# Patient Record
Sex: FEMALE | Race: BLACK OR AFRICAN AMERICAN | Employment: FULL TIME | URBAN - METROPOLITAN AREA
[De-identification: names, ages, dates, MRNs, and addresses within clinical notes are randomized per-mention and may not be internally consistent; named-entity substitution may affect disease eponyms.]

---

## 2016-08-04 LAB
ANTIBODY SCREEN, EXTERNAL: NORMAL
CHLAMYDIA, EXTERNAL: NORMAL
HBSAG, EXTERNAL: NORMAL
HIV, EXTERNAL: NORMAL
N. GONORRHEA, EXTERNAL: NORMAL
RPR, EXTERNAL: NORMAL
RUBELLA, EXTERNAL: NORMAL
TYPE, ABO & RH, EXTERNAL: NORMAL

## 2017-01-12 ENCOUNTER — APPOINTMENT (OUTPATIENT)
Dept: ULTRASOUND IMAGING | Age: 37
End: 2017-01-12
Attending: OBSTETRICS & GYNECOLOGY
Payer: COMMERCIAL

## 2017-01-12 ENCOUNTER — HOSPITAL ENCOUNTER (OUTPATIENT)
Age: 37
Setting detail: OBSERVATION
Discharge: HOME | End: 2017-01-13
Attending: OBSTETRICS & GYNECOLOGY | Admitting: OBSTETRICS & GYNECOLOGY
Payer: COMMERCIAL

## 2017-01-12 LAB
ABO + RH BLD: NORMAL
ALBUMIN SERPL BCP-MCNC: 2.7 G/DL (ref 3.4–5)
ALBUMIN/GLOB SERPL: 0.7 {RATIO} (ref 0.8–1.7)
ALP SERPL-CCNC: 137 U/L (ref 45–117)
ALT SERPL-CCNC: 19 U/L (ref 13–56)
AMYLASE SERPL-CCNC: 54 U/L (ref 25–115)
ANION GAP BLD CALC-SCNC: 12 MMOL/L (ref 3–18)
APPEARANCE UR: ABNORMAL
APPEARANCE UR: CLEAR
AST SERPL W P-5'-P-CCNC: 14 U/L (ref 15–37)
BASOPHILS # BLD AUTO: 0 K/UL (ref 0–0.06)
BASOPHILS # BLD: 0 % (ref 0–2)
BILIRUB SERPL-MCNC: 0.5 MG/DL (ref 0.2–1)
BILIRUB UR QL: NEGATIVE
BILIRUB UR QL: NEGATIVE
BLOOD GROUP ANTIBODIES SERPL: NORMAL
BUN SERPL-MCNC: 7 MG/DL (ref 7–18)
BUN/CREAT SERPL: 11 (ref 12–20)
CALCIUM SERPL-MCNC: 9 MG/DL (ref 8.5–10.1)
CHLORIDE SERPL-SCNC: 101 MMOL/L (ref 100–108)
CO2 SERPL-SCNC: 24 MMOL/L (ref 21–32)
COLOR UR: YELLOW
COLOR UR: YELLOW
CREAT SERPL-MCNC: 0.62 MG/DL (ref 0.6–1.3)
DIFFERENTIAL METHOD BLD: ABNORMAL
EOSINOPHIL # BLD: 0 K/UL (ref 0–0.4)
EOSINOPHIL NFR BLD: 0 % (ref 0–5)
ERYTHROCYTE [DISTWIDTH] IN BLOOD BY AUTOMATED COUNT: 14.2 % (ref 11.6–14.5)
FIBRONECTIN FETAL VAG QL: NEGATIVE
GLOBULIN SER CALC-MCNC: 4 G/DL (ref 2–4)
GLUCOSE SERPL-MCNC: 91 MG/DL (ref 74–99)
GLUCOSE UR QL STRIP.AUTO: NEGATIVE MG/DL
GLUCOSE UR QL STRIP.AUTO: NEGATIVE MG/DL
HCT VFR BLD AUTO: 32.2 % (ref 35–45)
HGB BLD-MCNC: 10.8 G/DL (ref 12–16)
KETONES UR-MCNC: ABNORMAL MG/DL
KETONES UR-MCNC: NEGATIVE MG/DL
LEUKOCYTE ESTERASE UR QL STRIP: NEGATIVE
LEUKOCYTE ESTERASE UR QL STRIP: NEGATIVE
LIPASE SERPL-CCNC: 115 U/L (ref 73–393)
LYMPHOCYTES # BLD AUTO: 10 % (ref 21–52)
LYMPHOCYTES # BLD: 1.2 K/UL (ref 0.9–3.6)
MCH RBC QN AUTO: 27.7 PG (ref 24–34)
MCHC RBC AUTO-ENTMCNC: 33.5 G/DL (ref 31–37)
MCV RBC AUTO: 82.6 FL (ref 74–97)
MONOCYTES # BLD: 0.8 K/UL (ref 0.05–1.2)
MONOCYTES NFR BLD AUTO: 6 % (ref 3–10)
NEUTS SEG # BLD: 10.1 K/UL (ref 1.8–8)
NEUTS SEG NFR BLD AUTO: 84 % (ref 40–73)
NITRITE UR QL: NEGATIVE
NITRITE UR QL: NEGATIVE
PH UR: 6 [PH] (ref 5–8)
PH UR: 7 [PH] (ref 5–8)
PLATELET # BLD AUTO: 409 K/UL (ref 135–420)
PMV BLD AUTO: 9.8 FL (ref 9.2–11.8)
POTASSIUM SERPL-SCNC: 3.7 MMOL/L (ref 3.5–5.5)
PROT SERPL-MCNC: 6.7 G/DL (ref 6.4–8.2)
PROT UR QL: NEGATIVE MG/DL
PROT UR QL: NEGATIVE MG/DL
RBC # BLD AUTO: 3.9 M/UL (ref 4.2–5.3)
RBC # UR STRIP: ABNORMAL /UL
RBC # UR STRIP: NEGATIVE /UL
SODIUM SERPL-SCNC: 137 MMOL/L (ref 136–145)
SP GR UR: 1.02 (ref 1–1.03)
SP GR UR: 1.02 (ref 1–1.03)
SPECIMEN EXP DATE BLD: NORMAL
UROBILINOGEN UR QL: 0.2 EU/DL (ref 0.2–1)
UROBILINOGEN UR QL: 0.2 EU/DL (ref 0.2–1)
WBC # BLD AUTO: 12.1 K/UL (ref 4.6–13.2)

## 2017-01-12 PROCEDURE — 96361 HYDRATE IV INFUSION ADD-ON: CPT

## 2017-01-12 PROCEDURE — 83690 ASSAY OF LIPASE: CPT | Performed by: ADVANCED PRACTICE MIDWIFE

## 2017-01-12 PROCEDURE — 77030020255 HC SOL INJ LR 1000ML BG

## 2017-01-12 PROCEDURE — G0378 HOSPITAL OBSERVATION PER HR: HCPCS

## 2017-01-12 PROCEDURE — 82731 ASSAY OF FETAL FIBRONECTIN: CPT | Performed by: ADVANCED PRACTICE MIDWIFE

## 2017-01-12 PROCEDURE — 96374 THER/PROPH/DIAG INJ IV PUSH: CPT

## 2017-01-12 PROCEDURE — 81003 URINALYSIS AUTO W/O SCOPE: CPT

## 2017-01-12 PROCEDURE — 85025 COMPLETE CBC W/AUTO DIFF WBC: CPT | Performed by: ADVANCED PRACTICE MIDWIFE

## 2017-01-12 PROCEDURE — 82150 ASSAY OF AMYLASE: CPT | Performed by: ADVANCED PRACTICE MIDWIFE

## 2017-01-12 PROCEDURE — 96375 TX/PRO/DX INJ NEW DRUG ADDON: CPT

## 2017-01-12 PROCEDURE — 77030020365 HC SOL INJ SOD CL 0.9% 50ML

## 2017-01-12 PROCEDURE — 80053 COMPREHEN METABOLIC PANEL: CPT | Performed by: ADVANCED PRACTICE MIDWIFE

## 2017-01-12 PROCEDURE — 74011250636 HC RX REV CODE- 250/636: Performed by: ADVANCED PRACTICE MIDWIFE

## 2017-01-12 PROCEDURE — 86900 BLOOD TYPING SEROLOGIC ABO: CPT | Performed by: OBSTETRICS & GYNECOLOGY

## 2017-01-12 PROCEDURE — 74011250636 HC RX REV CODE- 250/636: Performed by: OBSTETRICS & GYNECOLOGY

## 2017-01-12 PROCEDURE — 99218 HC RM OBSERVATION: CPT

## 2017-01-12 PROCEDURE — 36415 COLL VENOUS BLD VENIPUNCTURE: CPT | Performed by: ADVANCED PRACTICE MIDWIFE

## 2017-01-12 PROCEDURE — 74011000250 HC RX REV CODE- 250: Performed by: OBSTETRICS & GYNECOLOGY

## 2017-01-12 PROCEDURE — 76705 ECHO EXAM OF ABDOMEN: CPT

## 2017-01-12 RX ORDER — HYDROMORPHONE HYDROCHLORIDE 2 MG/ML
1 INJECTION, SOLUTION INTRAMUSCULAR; INTRAVENOUS; SUBCUTANEOUS ONCE
Status: COMPLETED | OUTPATIENT
Start: 2017-01-12 | End: 2017-01-12

## 2017-01-12 RX ORDER — SODIUM CHLORIDE, SODIUM LACTATE, POTASSIUM CHLORIDE, CALCIUM CHLORIDE 600; 310; 30; 20 MG/100ML; MG/100ML; MG/100ML; MG/100ML
125 INJECTION, SOLUTION INTRAVENOUS CONTINUOUS
Status: DISCONTINUED | OUTPATIENT
Start: 2017-01-12 | End: 2017-01-13 | Stop reason: HOSPADM

## 2017-01-12 RX ORDER — SODIUM CHLORIDE, SODIUM LACTATE, POTASSIUM CHLORIDE, CALCIUM CHLORIDE 600; 310; 30; 20 MG/100ML; MG/100ML; MG/100ML; MG/100ML
999 INJECTION, SOLUTION INTRAVENOUS ONCE
Status: COMPLETED | OUTPATIENT
Start: 2017-01-12 | End: 2017-01-12

## 2017-01-12 RX ORDER — NALBUPHINE HYDROCHLORIDE 10 MG/ML
10 INJECTION, SOLUTION INTRAMUSCULAR; INTRAVENOUS; SUBCUTANEOUS
Status: DISCONTINUED | OUTPATIENT
Start: 2017-01-12 | End: 2017-01-13 | Stop reason: HOSPADM

## 2017-01-12 RX ORDER — LANOLIN ALCOHOL/MO/W.PET/CERES
325 CREAM (GRAM) TOPICAL
COMMUNITY
End: 2017-03-04

## 2017-01-12 RX ORDER — GLUCOSAMINE SULFATE 1500 MG
1000 POWDER IN PACKET (EA) ORAL DAILY
COMMUNITY

## 2017-01-12 RX ADMIN — SODIUM CHLORIDE, SODIUM LACTATE, POTASSIUM CHLORIDE, AND CALCIUM CHLORIDE 999 ML: 600; 310; 30; 20 INJECTION, SOLUTION INTRAVENOUS at 16:29

## 2017-01-12 RX ADMIN — HYDROMORPHONE HYDROCHLORIDE 1 MG: 2 INJECTION INTRAMUSCULAR; INTRAVENOUS; SUBCUTANEOUS at 22:51

## 2017-01-12 RX ADMIN — SODIUM CHLORIDE 25 MG: 9 INJECTION INTRAMUSCULAR; INTRAVENOUS; SUBCUTANEOUS at 22:49

## 2017-01-12 RX ADMIN — SODIUM CHLORIDE, SODIUM LACTATE, POTASSIUM CHLORIDE, AND CALCIUM CHLORIDE 125 ML/HR: 600; 310; 30; 20 INJECTION, SOLUTION INTRAVENOUS at 17:59

## 2017-01-12 RX ADMIN — SODIUM CHLORIDE, SODIUM LACTATE, POTASSIUM CHLORIDE, AND CALCIUM CHLORIDE 125 ML/HR: 600; 310; 30; 20 INJECTION, SOLUTION INTRAVENOUS at 21:26

## 2017-01-12 RX ADMIN — NALBUPHINE HYDROCHLORIDE 10 MG: 10 INJECTION, SOLUTION INTRAMUSCULAR; INTRAVENOUS; SUBCUTANEOUS at 17:45

## 2017-01-12 NOTE — PROGRESS NOTES
Patient wheelchaired to unit with c/o contractions with unknown times. . 32 5/7 weeks. Denies leaking of vaginal fluids or bleeding. States positive fetal movement. EFM and [place] applied. FHR is 175. Oriented to room and surroundings. Significant other supportive at bedside. RN put hands on abdomen, no tachysystole noted.

## 2017-01-12 NOTE — PROGRESS NOTES
History & Physical    Name: Gigi Herrera MRN: [id number]  SSN: [id number]    YOB: 1980  Age: 39 y.o. Sex: female        Subjective:     Estimated Date of Delivery: 3/4/17  OB History      Para Term  AB TAB SAB Ectopic Multiple Living    3 1                  Ms. Domo Guerrero is admitted with pregnancy at 32w5d for acute RUQ pain. Pt states she had low right back pain last night. It was crampy like menstrual cramps. Ate normal dinner around 4 pm without problems. States she used some heat on low back and slept until about 4 AM.  In am the back pain was resolved and had RUQ pain that had sharp components but not too bad. 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 Came to L and D writhing in pain. Pt has not taken anything for pain. Denies fever, Nausea or vomititng. Last BM was normal and soft this AM.  Pt initially thought might be gas but not relenting. Comes in [place]. No fever. Has not eaten today due to lack of appetite. . Prenatal course was complicated by advanced maternal age. Pt declined any genetic counseling or testing. Prenatal care has been followed by [facility]. Please see prenatal records for details. Past Medical History   Diagnosis Date    Anemia      Past Surgical History   Procedure Laterality Date    Pr  delivery only       Social History     Occupational History    Not on file. Social History Main Topics    Smoking status: Former Smoker     Quit date: 2015    Smokeless tobacco: Not on file    Alcohol use No    Drug use: No    Sexual activity: Yes     Partners: Male     History reviewed. No pertinent family history. Allergies   Allergen Reactions    Sulfa (Sulfonamide Antibiotics) Hives     Prior to Admission medications    Medication Sig Start Date End Date Taking? Authorizing Provider   prenatal multivit-ca-min-fe-fa tab Take 1 Tab by mouth daily.     Historical Provider ferrous sulfate (IRON) 325 mg (65 mg iron) tablet Take 325 mg by mouth Daily (before breakfast). Historical Provider   cholecalciferol (VITAMIN D3) 1,000 unit cap Take 1,000 Units by mouth daily. Historical Provider        Review of Systems: A comprehensive review of systems was negative except for that written in the HPI. Objective:     Vitals:  Vitals:    01/12/17 1617 01/12/17 1619   BP: 110/67    Pulse: (!) 108    Resp: 24    Temp: 98.2 °F (36.8 °C)    Weight:  86.6 kg (191 lb)   Height:  5' 4\" (1.626 m)        Physical Exam:  Patient with distress. Back: costovertebral angle tenderness absent  Abdomen: soft, tender to deep palpation in RUQ above uterus   Fundus: soft and non tender  Perineum: blood absent, amniotic fluid absent  Cervical Exam: Closed/Thick/High per RN  Membranes:  Intact  Fetal Heart Rate: Reactive  Accelerations: yes  Uterine irritability: yes    Prenatal Labs:   No results found for: RUBELLAEXT, GRBSEXT, HBSAGEXT, HIVEXT, RPREXT, GONNOEXT, CHLAMEXT    FFN: NEG  RTUS : Vtx,  Adequate fluid,  Placenta Grade 2 Fundal.   Assessment/Plan:     Plan: Admit for observation and pain control. FFN done and doubt pt is having PTL. Uterus is soft and doubt abruption. RTUS done and fetus is vertex. Placenta is fundal and off to the left side. Adequate fluid noted. In muscle wall at fundus on the right appears to be a fibroid 5 x 3 cm. Liver edge seen with GB round and measures 1.94 cm in diameter. Will order labs to r/o infection or abdominal process. Will consult surgery but symptoms not c/w appy or cholecystis. Will page Dr. Darius Arceo who is on call.       Signed By:  Brannon Borden MD     January 12, 2017

## 2017-01-12 NOTE — H&P
History & Physical     Name: Irasema Huertas MRN: [id number]  SSN: [id number]    YOB: 1980  Age: 39 y.o. Sex: female          Subjective:      Estimated Date of Delivery: 3/4/17  OB History      Para Term  AB TAB SAB Ectopic Multiple Living     3 1                            Ms. Mandy Daly is admitted with pregnancy at 32w5d for acute RUQ pain. Pt states she had low right back pain last night. It was crampy like menstrual cramps. Ate normal dinner around 4 pm without problems. States she used some heat on low back and slept until about 4 AM. In am the back pain was resolved and had RUQ pain that had sharp components but not too bad. 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 Came to L and D writhing in pain. Pt has not taken anything for pain. Denies fever, Nausea or vomititng. Last BM was normal and soft this AM. Pt initially thought might be gas but not relenting. Comes in [place]. No fever. Has not eaten today due to lack of appetite. . Prenatal course was complicated by advanced maternal age. Pt declined any genetic counseling or testing. Prenatal care has been followed by [facility]. Please see prenatal records for details.          Past Medical History   Diagnosis Date    Anemia              Past Surgical History   Procedure Laterality Date    Pr  delivery only          Social History          Occupational History    Not on file.            Social History Main Topics    Smoking status: Former Smoker       Quit date: 2015    Smokeless tobacco: Not on file    Alcohol use No    Drug use: No    Sexual activity: Yes       Partners: Male      History reviewed. No pertinent family history.          Allergies   Allergen Reactions    Sulfa (Sulfonamide Antibiotics) Hives              Prior to Admission medications    Medication Sig Start Date End Date Taking?  Authorizing Provider   prenatal multivit-ca-min-fe-fa tab Take 1 Tab by mouth daily.       Historical Provider   ferrous sulfate (IRON) 325 mg (65 mg iron) tablet Take 325 mg by mouth Daily (before breakfast).       Historical Provider   cholecalciferol (VITAMIN D3) 1,000 unit cap Take 1,000 Units by mouth daily.       Historical Provider         Review of Systems: A comprehensive review of systems was negative except for that written in the HPI.     Objective:      Vitals:       Vitals:     01/12/17 1617 01/12/17 1619   BP: 110/67     Pulse: (!) 108     Resp: 24     Temp: 98.2 °F (36.8 °C)     Weight:   86.6 kg (191 lb)   Height:   5' 4\" (1.626 m)         Physical Exam:  Patient with distress. Back: costovertebral angle tenderness absent  Abdomen: soft, tender to deep palpation in RUQ above uterus   Fundus: soft and non tender  Perineum: blood absent, amniotic fluid absent  Cervical Exam: Closed/Thick/High per RN  Membranes: Intact  Fetal Heart Rate: Reactive  Accelerations: yes  Uterine irritability: yes     Prenatal Labs:   No results found for: RUBELLAEXT, GRBSEXT, HBSAGEXT, HIVEXT, RPREXT, GONNOEXT, CHLAMEXT     FFN: NEG  RTUS : Vtx, Adequate fluid, Placenta Grade 2 Fundal.   Assessment/Plan:      Plan: Admit for observation and pain control. FFN done and doubt pt is having PTL. Uterus is soft and doubt abruption. RTUS done and fetus is vertex. Placenta is fundal and off to the left side. Adequate fluid noted. In muscle wall at fundus on the right appears to be a fibroid 5 x 3 cm. Liver edge seen with GB round and measures 1.94 cm in diameter. Will order labs to r/o infection or abdominal process. Will consult surgery but symptoms not c/w appy or cholecystis. Will page Dr. Narciso Tracey who is on call.       Signed By:  Yaritza Roa MD        Addendum:      Pt had a slice of pizza today with some apples. Fhx significant for Mother and 4 Aunts with cholecystectomy.

## 2017-01-12 NOTE — IP AVS SNAPSHOT
[address] Patient: Martine Baron MRN: [id number] You are allergic to the following Allergen Reactions Sulfa (Sulfonamide Antibiotics) Hives Recent Documentation Height Weight Breastfeeding? BMI OB Status Smoking Status 1.626 m 86.6 kg Yes 32.79 kg/m2 Pregnant Former Smoker Emergency Contacts Name Discharge Info Relation Home Work Mobile Sky Leone DISCHARGE CAREGIVER [3] Boyfriend [17] [phone] About your hospitalization You were admitted on:  January 12, 2017 You last received care in the:  [address] You were discharged on:  January 13, 2017 Unit phone number:  [phone] Why you were hospitalized Your primary diagnosis was:  Not on File Providers Seen During Your Hospitalizations Provider Role Specialty Primary office phone Jeffrey Redman MD Attending Provider Obstetrics & Gynecology [phone] Your Primary Care Physician (PCP) Primary Care Physician Office Phone Office Fax Edgar Fish [phone] Follow-up Information Follow up With Details Comments Contact Info Madeline Cushing, MD   [address] [facility] [address] [place] [address] 
[phone] Current Discharge Medication List  
  
START taking these medications Dose & Instructions Dispensing Information Comments Morning Noon Evening Bedtime  
 oxyCODONE-acetaminophen 5-325 mg per tablet Commonly known as:  PERCOCET Your next dose is: Today, Tomorrow Other:  _________ Dose:  2 Tab Take 2 Tabs by mouth every four (4) hours as needed. Max Daily Amount: 12 Tabs. Quantity:  30 Tab Refills:  0 CONTINUE these medications which have NOT CHANGED Dose & Instructions Dispensing Information Comments Morning Noon Evening Bedtime Iron 325 mg (65 mg iron) tablet Generic drug:  ferrous sulfate Your next dose is: Today, Tomorrow Other:  _________ Dose:  325 mg Take 325 mg by mouth Daily (before breakfast). Refills:  0  
     
   
   
   
  
 prenatal multivit-ca-min-fe-fa Tab Your next dose is: Today, Tomorrow Other:  _________ Dose:  1 Tab Take 1 Tab by mouth daily. Refills:  0  
     
   
   
   
  
 VITAMIN D3 1,000 unit Cap Generic drug:  cholecalciferol Your next dose is: Today, Tomorrow Other:  _________ Dose:  1000 Units Take 1,000 Units by mouth daily. Refills:  0 Where to Get Your Medications Information on where to get these meds will be given to you by the nurse or doctor. ! Ask your nurse or doctor about these medications  
  oxyCODONE-acetaminophen 5-325 mg per tablet Discharge Instructions None Discharge Instructions Attachments/References ACUTE CHOLECYSTITIS: GENERAL INFO (ENGLISH) PREGNANCY: WEEKS 32 TO 34 (ENGLISH) PREGNANCY: KICK COUNTS (ENGLISH) PREGNANCY: PRECAUTIONS (ENGLISH) Discharge Orders None [employer] Announcement We are excited to announce that we are making your provider's discharge notes available to you in Spinlister. You will see these notes when they are completed and signed by the physician that discharged you from your recent hospital stay. If you have any questions or concerns about any information you see in Spinlister, please call the Health Information Department where you were seen or reach out to your Primary Care Provider for more information about your plan of care. Introducing [facility]!    
 Hu Chery introduces Spinlister patient portal. Now you can access parts of your medical record, email your doctor's office, and request medication refills online. 1. In your internet browser, go to [email or web address] 2. Click on the First Time User? Click Here link in the Sign In box. You will see the New Member Sign Up page. 3. Enter your EpicForce Access Code exactly as it appears below. You will not need to use this code after youve completed the sign-up process. If you do not sign up before the expiration date, you must request a new code. · [employer] Access Code: [facility] Expires: 4/13/2017  4:07 PM 
 
4. Enter the last four digits of your Social Security Number (xxxx) and Date of Birth (mm/dd/yyyy) as indicated and click Submit. You will be taken to the next sign-up page. 5. Create a EpicForce ID. This will be your EpicForce login ID and cannot be changed, so think of one that is secure and easy to remember. 6. Create a EpicForce password. You can change your password at any time. 7. Enter your Password Reset Question and Answer. This can be used at a later time if you forget your password. 8. Enter your e-mail address. You will receive e-mail notification when new information is available in [address] 9. Click Sign Up. You can now view and download portions of your medical record. 10. Click the Download Summary menu link to download a portable copy of your medical information. If you have questions, please visit the Frequently Asked Questions section of the EpicForce website. Remember, EpicForce is NOT to be used for urgent needs. For medical emergencies, dial 911. Now available from your iPhone and Android! General Information Please provide this summary of care documentation to your next provider. Patient Signature:  ____________________________________________________________ Date:  ____________________________________________________________  
  
Eder Shove Provider Signature:  ____________________________________________________________ Date:  ____________________________________________________________ More Information Learning About Acute Cholecystitis What is cholecystitis? Cholecystitis (say \"koh-lih-sis-TY-tus\") is inflammation of the gallbladder. The gallbladder stores bile. Bile helps the body digest food. Normally, the bile flows from the gallbladder to the small intestine. A gallstone stuck in the cystic duct is most often the cause of sudden (acute) cholecystitis. The cystic duct is the tube that carries the bile out of the gallbladder. The gallstone blocks the bile from leaving the gallbladder. This results in an irritated and swollen gallbladder. The disease can also be caused by infection or trauma, such as an injury from a car accident. Cholecystitis has to be treated right away. You will probably have to go to the hospital. Surgery is the usual treatment. What are the symptoms? Symptoms include: · Steady and severe pain in the upper right part of belly. This is the most common symptom. The pain can sometimes move to your back or right shoulder blade. It may last for more than 6 hours. · Nausea or vomiting. · A fever. How is it treated? The main way to treat this disease is surgery to remove the gallbladder. This surgery can often be done through small cuts (incisions) in the belly. This is called a laparoscopic cholecystectomy. In some cases, you may need a more extensive surgery. You may need surgery as soon as possible. The doctor may try to reduce swelling and irritation in the gallbladder before removing it. You may be given fluids and antibiotics through an IV. You may also be given pain medicine. Follow-up care is a key part of your treatment and safety. Be sure to make and go to all appointments, and call your doctor if you are having problems. It's also a good idea to know your test results and keep a list of the medicines you take. Where can you learn more? Go to http://lucius-caleb.info/. Enter T940 in the search box to learn more about \"Learning About Acute Cholecystitis. \" Current as of: 2016 Content Version: 11.1 © 1387-5594 [employer]. Care instructions adapted under license by [employer] (which disclaims liability or warranty for this information). If you have questions about a medical condition or this instruction, always ask your healthcare professional. [address] any warranty or liability for your use of this information. Weeks 32 to 34 of Your Pregnancy: Care Instructions Your Care Instructions During the last few weeks of your pregnancy, you may have more aches and pains. It's important to rest when you can. Your growing baby is putting more pressure on your bladder. So you may need to urinate more often. Hemorrhoids are also common. These are painful, itchy veins in the rectal area. In the 36th week, most women have a test for group B streptococcus (GBS). GBS is a common bacteria that can live in the vagina and rectum. It can make your baby sick after birth. If you test positive, you will get antibiotics during labor. These will keep your baby from getting the bacteria. You may want to talk with your doctor about banking your baby's umbilical cord blood. This is the blood left in the cord after birth. If you want to save this blood, you must arrange it ahead of time. You can't decide at the last minute. If you haven't already had the Tdap shot during this pregnancy, talk to your doctor about getting it. It will help protect your  against pertussis infection. Follow-up care is a key part of your treatment and safety. Be sure to make and go to all appointments, and call your doctor if you are having problems. It's also a good idea to know your test results and keep a list of the medicines you take. How can you care for yourself at home? Ease hemorrhoids · Get more liquids, fruits, vegetables, and fiber in your diet. This will help keep your stools soft. · Avoid sitting for too long. Lie on your left side several times a day. · Clean yourself with soft, moist toilet paper. Or you can use witch hazel pads or personal hygiene pads. · If you are uncomfortable, try ice packs. Or you can sit in a warm sitz bath. Do these for 20 minutes at a time, as needed. · Use hydrocortisone cream for pain and itching. Two examples are Anusol and Preparation H Hydrocortisone. · Ask your doctor about taking an over-the-counter stool softener. Consider breastfeeding · Experts recommend that women breastfeed for 1 year or longer. Breast milk is the perfect food for babies. · Breast milk is easier for babies to digest than formula. And it is always available, just the right temperature, and free. · In general, babies who are  are healthier than formula-fed babies. ¨  babies are less likely to get ear infections, colds, diarrhea, and pneumonia. ¨  babies who are fed only breast milk are less likely to get asthma and allergies. ¨  babies are less likely to be obese. ¨  babies are less likely to get diabetes or heart disease. · Women who breastfeed have less bleeding after the birth. Their uteruses also shrink back faster. · Some women who breastfeed lose weight faster. Making milk burns calories. · Breastfeeding can lower your risk of breast cancer, ovarian cancer, and osteoporosis. Decide about circumcision for boys · As you make this decision, it may help to think about your personal, Baptism, and family traditions. You get to decide if you will keep your son's penis natural or if he will be circumcised. · If you decide that you would like to have your baby circumcised, talk with your doctor. You can share your concerns about pain. And you can discuss your preferences for anesthesia. Where can you learn more? Go to http://lucius-caleb.info/. Enter H539 in the search box to learn more about \"Weeks 32 to 34 of Your Pregnancy: Care Instructions. \" Current as of: May 30, 2016 Content Version: 11.1 © 8838-4926 [employer]. Care instructions adapted under license by [employer] (which disclaims liability or warranty for this information). If you have questions about a medical condition or this instruction, always ask your healthcare professional. [place] [address] any warranty or liability for your use of this information. Counting Your Baby's Kicks: Care Instructions Your Care Instructions Counting your baby's kicks is one way your doctor can tell that your baby is healthy. Most womenespecially in a first pregnancyfeel their baby move for the first time between 16 and 22 weeks. The movement may feel like flutters rather than kicks. Your baby may move more at certain times of the day. When you are active, you may notice less kicking than when you are resting. At your prenatal visits, your doctor will ask whether the baby is active. In your last trimester, your doctor may ask you to count the number of times you feel your baby move. Follow-up care is a key part of your treatment and safety. Be sure to make and go to all appointments, and call your doctor if you are having problems. It's also a good idea to know your test results and keep a list of the medicines you take. How do you count fetal kicks? · A common method of checking your baby's movement is to count the number of kicks or moves you feel in 1 hour. Ten movements (such as kicks, flutters, or rolls) in 1 hour are normal. Some doctors suggest that you count in the morning until you get to 10 movements. Then you can quit for that day and start again the next day. · Pick your baby's most active time of day to count.  This may be any time from morning to evening. · If you do not feel 10 movements in an hour, your baby may be sleeping. Wait for the next hour and count again. When should you call for help? Call your doctor now or seek immediate medical care if: 
· You noticed that your baby has stopped moving or is moving much less than normal. 
Watch closely for changes in your health, and be sure to contact your doctor if you have any problems. Where can you learn more? Go to http://lucius-caleb.info/. Enter E578 in the search box to learn more about \"Counting Your Baby's Kicks: Care Instructions. \" Current as of: May 30, 2016 Content Version: 11.1 © 5722-5730 [employer]. Care instructions adapted under license by Orle[employer] (which disclaims liability or warranty for this information). If you have questions about a medical condition or this instruction, always ask your healthcare professional. [place] [address] any warranty or liability for your use of this information. Pregnancy Precautions: Care Instructions Your Care Instructions There is no sure way to prevent labor before your due date ( labor) or to prevent most other pregnancy problems. But there are things you can do to increase your chances of a healthy pregnancy. Go to your appointments, follow your doctor's advice, and take good care of yourself. Eat well, and exercise (if your doctor agrees). And make sure to drink plenty of water. Follow-up care is a key part of your treatment and safety. Be sure to make and go to all appointments, and call your doctor if you are having problems. It's also a good idea to know your test results and keep a list of the medicines you take. How can you care for yourself at home? · Make sure you go to your prenatal appointments. At each visit, your doctor will check your blood pressure.  Your doctor will also check to see if you have protein in your urine. High blood pressure and protein in urine are signs of preeclampsia. This condition can be dangerous for you and your baby. · Drink plenty of fluids, enough so that your urine is light yellow or clear like water. Dehydration can cause contractions. If you have kidney, heart, or liver disease and have to limit fluids, talk with your doctor before you increase the amount of fluids you drink. · Tell your doctor right away if you notice any symptoms of an infection, such as: ¨ Burning when you urinate. ¨ A foul-smelling discharge from your vagina. ¨ Vaginal itching. ¨ Unexplained fever. ¨ Unusual pain or soreness in your uterus or lower belly. · Eat a balanced diet. Include plenty of foods that are high in calcium and iron. ¨ Foods high in calcium include milk, cheese, yogurt, almonds, and broccoli. ¨ Foods high in iron include red meat, shellfish, poultry, eggs, beans, raisins, whole-grain bread, and leafy green vegetables. · Do not smoke. If you need help quitting, talk to your doctor about stop-smoking programs and medicines. These can increase your chances of quitting for good. · Do not drink alcohol or use illegal drugs. · Follow your doctor's directions about activity. Your doctor will let you know how much, if any, exercise you can do. · Ask your doctor if you can have sex. If you are at risk for early labor, your doctor may ask you to not have sex. · Take care to prevent falls. During pregnancy, your joints are loose, and your balance is off. Sports such as bicycling, skiing, or in-line skating can increase your risk of falling. And don't ride horses or motorcycles, dive, water ski, scuba dive, or parachute jump while you are pregnant. · Avoid getting very hot. Do not use saunas or hot tubs. Avoid staying out in the sun in hot weather for long periods. Take acetaminophen (Tylenol) to lower a high fever. · Do not take any over-the-counter or herbal medicines or supplements without talking to your doctor or pharmacist first. 
When should you call for help? Call 911 anytime you think you may need emergency care. For example, call if: 
· You passed out (lost consciousness). · You have severe vaginal bleeding. · You have severe pain in your belly or pelvis. · You have had fluid gushing or leaking from your vagina and you know or think the umbilical cord is bulging into your vagina. If this happens, immediately get down on your knees so your rear end (buttocks) is higher than your head. This will decrease the pressure on the cord until help arrives. Call your doctor now or seek immediate medical care if: 
· You have signs of preeclampsia, such as: 
¨ Sudden swelling of your face, hands, or feet. ¨ New vision problems (such as dimness or blurring). ¨ A severe headache. · You have any vaginal bleeding. · You have belly pain or cramping. · You have a fever. · You have had regular contractions (with or without pain) for an hour. This means that you have 8 or more within 1 hour or 4 or more in 20 minutes after you change your position and drink fluids. · You have a sudden release of fluid from your vagina. · You have low back pain or pelvic pressure that does not go away. · You notice that your baby has stopped moving or is moving much less than normal. 
Watch closely for changes in your health, and be sure to contact your doctor if you have any problems. Where can you learn more? Go to http://lucius-caleb.info/. Enter [id number] in the search box to learn more about \"Pregnancy Precautions: Care Instructions. \" Current as of: May 30, 2016 Content Version: 11.1 © 5426-0514 [employer]. Care instructions adapted under license by [employer] (which disclaims liability or warranty for this information).  If you have questions about a medical condition or this instruction, always ask your healthcare professional. [place] [address] any warranty or liability for your use of this information.

## 2017-01-12 NOTE — IP AVS SNAPSHOT
Current Discharge Medication List  
  
Take these medications at their scheduled times Dose & Instructions Dispensing Information Comments Morning Noon Evening Bedtime Iron 325 mg (65 mg iron) tablet Generic drug:  ferrous sulfate Your next dose is: Today, Tomorrow Other:  ____________ Dose:  325 mg Take 325 mg by mouth Daily (before breakfast). Refills:  0  
     
   
   
   
  
 prenatal multivit-ca-min-fe-fa Tab Your next dose is: Today, Tomorrow Other:  ____________ Dose:  1 Tab Take 1 Tab by mouth daily. Refills:  0  
     
   
   
   
  
 VITAMIN D3 1,000 unit Cap Generic drug:  cholecalciferol Your next dose is: Today, Tomorrow Other:  ____________ Dose:  1000 Units Take 1,000 Units by mouth daily. Refills:  0 Take these medications as needed Dose & Instructions Dispensing Information Comments Morning Noon Evening Bedtime  
 oxyCODONE-acetaminophen 5-325 mg per tablet Commonly known as:  PERCOCET Your next dose is: Today, Tomorrow Other:  ____________ Dose:  2 Tab Take 2 Tabs by mouth every four (4) hours as needed. Max Daily Amount: 12 Tabs. Quantity:  30 Tab Refills:  0 Where to Get Your Medications Information about where to get these medications is not yet available ! Ask your nurse or doctor about these medications  
  oxyCODONE-acetaminophen 5-325 mg per tablet

## 2017-01-13 VITALS
SYSTOLIC BLOOD PRESSURE: 110 MMHG | TEMPERATURE: 98.2 F | RESPIRATION RATE: 16 BRPM | DIASTOLIC BLOOD PRESSURE: 72 MMHG | WEIGHT: 191 LBS | BODY MASS INDEX: 32.61 KG/M2 | HEIGHT: 64 IN | HEART RATE: 102 BPM

## 2017-01-13 LAB
BASOPHILS # BLD AUTO: 0 K/UL (ref 0–0.06)
BASOPHILS # BLD: 0 % (ref 0–2)
DIFFERENTIAL METHOD BLD: ABNORMAL
EOSINOPHIL # BLD: 0 K/UL (ref 0–0.4)
EOSINOPHIL NFR BLD: 0 % (ref 0–5)
ERYTHROCYTE [DISTWIDTH] IN BLOOD BY AUTOMATED COUNT: 14.4 % (ref 11.6–14.5)
HCT VFR BLD AUTO: 30 % (ref 35–45)
HGB BLD-MCNC: 10.1 G/DL (ref 12–16)
LYMPHOCYTES # BLD AUTO: 19 % (ref 21–52)
LYMPHOCYTES # BLD: 1.6 K/UL (ref 0.9–3.6)
MCH RBC QN AUTO: 28 PG (ref 24–34)
MCHC RBC AUTO-ENTMCNC: 33.7 G/DL (ref 31–37)
MCV RBC AUTO: 83.1 FL (ref 74–97)
MONOCYTES # BLD: 0.9 K/UL (ref 0.05–1.2)
MONOCYTES NFR BLD AUTO: 11 % (ref 3–10)
NEUTS SEG # BLD: 5.7 K/UL (ref 1.8–8)
NEUTS SEG NFR BLD AUTO: 70 % (ref 40–73)
PLATELET # BLD AUTO: 377 K/UL (ref 135–420)
PMV BLD AUTO: 9.8 FL (ref 9.2–11.8)
RBC # BLD AUTO: 3.61 M/UL (ref 4.2–5.3)
WBC # BLD AUTO: 8.3 K/UL (ref 4.6–13.2)

## 2017-01-13 PROCEDURE — 96375 TX/PRO/DX INJ NEW DRUG ADDON: CPT

## 2017-01-13 PROCEDURE — 77030020255 HC SOL INJ LR 1000ML BG

## 2017-01-13 PROCEDURE — G0378 HOSPITAL OBSERVATION PER HR: HCPCS

## 2017-01-13 PROCEDURE — 99285 EMERGENCY DEPT VISIT HI MDM: CPT

## 2017-01-13 PROCEDURE — 96374 THER/PROPH/DIAG INJ IV PUSH: CPT

## 2017-01-13 PROCEDURE — 99218 HC RM OBSERVATION: CPT

## 2017-01-13 PROCEDURE — 74011250637 HC RX REV CODE- 250/637: Performed by: OBSTETRICS & GYNECOLOGY

## 2017-01-13 PROCEDURE — 76815 OB US LIMITED FETUS(S): CPT

## 2017-01-13 PROCEDURE — 96361 HYDRATE IV INFUSION ADD-ON: CPT

## 2017-01-13 PROCEDURE — 59025 FETAL NON-STRESS TEST: CPT

## 2017-01-13 PROCEDURE — 96376 TX/PRO/DX INJ SAME DRUG ADON: CPT

## 2017-01-13 PROCEDURE — 36415 COLL VENOUS BLD VENIPUNCTURE: CPT | Performed by: SPECIALIST

## 2017-01-13 PROCEDURE — 85025 COMPLETE CBC W/AUTO DIFF WBC: CPT | Performed by: SPECIALIST

## 2017-01-13 PROCEDURE — 74011250636 HC RX REV CODE- 250/636: Performed by: OBSTETRICS & GYNECOLOGY

## 2017-01-13 PROCEDURE — 74011250636 HC RX REV CODE- 250/636: Performed by: ADVANCED PRACTICE MIDWIFE

## 2017-01-13 PROCEDURE — 96360 HYDRATION IV INFUSION INIT: CPT

## 2017-01-13 RX ORDER — OXYCODONE AND ACETAMINOPHEN 5; 325 MG/1; MG/1
2 TABLET ORAL
Qty: 30 TAB | Refills: 0 | Status: ON HOLD | OUTPATIENT
Start: 2017-01-13 | End: 2017-03-01

## 2017-01-13 RX ORDER — OXYCODONE AND ACETAMINOPHEN 5; 325 MG/1; MG/1
2 TABLET ORAL
Status: DISCONTINUED | OUTPATIENT
Start: 2017-01-13 | End: 2017-01-13 | Stop reason: HOSPADM

## 2017-01-13 RX ADMIN — OXYCODONE HYDROCHLORIDE AND ACETAMINOPHEN 2 TABLET: 5; 325 TABLET ORAL at 14:05

## 2017-01-13 RX ADMIN — SODIUM CHLORIDE, SODIUM LACTATE, POTASSIUM CHLORIDE, AND CALCIUM CHLORIDE 125 ML/HR: 600; 310; 30; 20 INJECTION, SOLUTION INTRAVENOUS at 07:03

## 2017-01-13 RX ADMIN — NALBUPHINE HYDROCHLORIDE 10 MG: 10 INJECTION, SOLUTION INTRAMUSCULAR; INTRAVENOUS; SUBCUTANEOUS at 05:15

## 2017-01-13 RX ADMIN — NALBUPHINE HYDROCHLORIDE 10 MG: 10 INJECTION, SOLUTION INTRAMUSCULAR; INTRAVENOUS; SUBCUTANEOUS at 07:59

## 2017-01-13 NOTE — PROGRESS NOTES
Bedside and Verbal shift change report given to BLAISE Dunaway (oncoming nurse) by Jia Clark RN (offgoing nurse). Report included the following information SBAR, Intake/Output, MAR and Recent Results.

## 2017-01-13 NOTE — PROGRESS NOTES
Reviewed discharge instructions with patient. No questions at end of teaching. VSS. Wheelchair took patient downstairs for discharge.

## 2017-01-13 NOTE — PROGRESS NOTES
Progress Note:      Pt with some relief from the Nubain. Still with waves of discomfort. Exam:  Uterus with some tenderness in RUQ ? Over small fiborid but also tender in RUQ above the uterus. Recent Results (from the past 24 hour(s))   FETAL FIBRONECTIN    Collection Time: 01/12/17  4:14 PM   Result Value Ref Range    Fetal fibronectin NEGATIVE  NEG     POC URINE MACROSCOPIC    Collection Time: 01/12/17  5:03 PM   Result Value Ref Range    Color YELLOW      Appearance HAZY      Spec. gravity (POC) 1.025 1.003 - 1.030      pH, urine  (POC) 6.0 5.0 - 8.0      Protein (POC) NEGATIVE  NEG mg/dL    Glucose, urine (POC) NEGATIVE  NEG mg/dL    Ketones (POC) NEGATIVE  NEG mg/dL    Bilirubin (POC) NEGATIVE  NEG      Blood (POC) TRACE (A) NEG      Urobilinogen (POC) 0.2 0.2 - 1.0 EU/dL    Nitrite (POC) NEGATIVE  NEG      Leukocyte esterase (POC) NEGATIVE  NEG     POC URINE MACROSCOPIC    Collection Time: 01/12/17  5:06 PM   Result Value Ref Range    Color YELLOW      Appearance CLEAR      Spec. gravity (POC) 1.020 1.003 - 1.030      pH, urine  (POC) 7.0 5.0 - 8.0      Protein (POC) NEGATIVE  NEG mg/dL    Glucose, urine (POC) NEGATIVE  NEG mg/dL    Ketones (POC) TRACE (A) NEG mg/dL    Bilirubin (POC) NEGATIVE  NEG      Blood (POC) NEGATIVE  NEG      Urobilinogen (POC) 0.2 0.2 - 1.0 EU/dL    Nitrite (POC) NEGATIVE  NEG      Leukocyte esterase (POC) NEGATIVE  NEG     CBC WITH AUTOMATED DIFF    Collection Time: 01/12/17  5:47 PM   Result Value Ref Range    WBC 12.1 4.6 - 13.2 K/uL    RBC 3.90 (L) 4.20 - 5.30 M/uL    HGB 10.8 (L) 12.0 - 16.0 g/dL    HCT 32.2 (L) 35.0 - 45.0 %    MCV 82.6 74.0 - 97.0 FL    MCH 27.7 24.0 - 34.0 PG    MCHC 33.5 31.0 - 37.0 g/dL    RDW 14.2 11.6 - 14.5 %    PLATELET 464 349 - 900 K/uL    MPV 9.8 9.2 - 11.8 FL    NEUTROPHILS 84 (H) 40 - 73 %    LYMPHOCYTES 10 (L) 21 - 52 %    MONOCYTES 6 3 - 10 %    EOSINOPHILS 0 0 - 5 %    BASOPHILS 0 0 - 2 %    ABS.  NEUTROPHILS 10.1 (H) 1.8 - 8.0 K/UL ABS. LYMPHOCYTES 1.2 0.9 - 3.6 K/UL    ABS. MONOCYTES 0.8 0.05 - 1.2 K/UL    ABS. EOSINOPHILS 0.0 0.0 - 0.4 K/UL    ABS. BASOPHILS 0.0 0.0 - 0.06 K/UL    DF AUTOMATED     METABOLIC PANEL, COMPREHENSIVE    Collection Time: 01/12/17  5:47 PM   Result Value Ref Range    Sodium 137 136 - 145 mmol/L    Potassium 3.7 3.5 - 5.5 mmol/L    Chloride 101 100 - 108 mmol/L    CO2 24 21 - 32 mmol/L    Anion gap 12 3.0 - 18 mmol/L    Glucose 91 74 - 99 mg/dL    BUN 7 7.0 - 18 MG/DL    Creatinine 0.62 0.6 - 1.3 MG/DL    BUN/Creatinine ratio 11 (L) 12 - 20      GFR est AA >60 >60 ml/min/1.73m2    GFR est non-AA >60 >60 ml/min/1.73m2    Calcium 9.0 8.5 - 10.1 MG/DL    Bilirubin, total 0.5 0.2 - 1.0 MG/DL    ALT 19 13 - 56 U/L    AST 14 (L) 15 - 37 U/L    Alk. phosphatase 137 (H) 45 - 117 U/L    Protein, total 6.7 6.4 - 8.2 g/dL    Albumin 2.7 (L) 3.4 - 5.0 g/dL    Globulin 4.0 2.0 - 4.0 g/dL    A-G Ratio 0.7 (L) 0.8 - 1.7     AMYLASE    Collection Time: 01/12/17  5:47 PM   Result Value Ref Range    Amylase 54 25 - 115 U/L   LIPASE    Collection Time: 01/12/17  5:47 PM   Result Value Ref Range    Lipase 115 73 - 393 U/L     Impression:  IUP at 32.5 days with RUQ pain. Labs normal except a left shift of WBC. Plan: Will await RUQ US results. Continue hydration.      Brannon Borden MD  January 12, 2017

## 2017-01-13 NOTE — DISCHARGE SUMMARY
Antepartum Discharge Summary     Name: Nikki Haro MRN: [id number]  SSN: [id number]    YOB: 1980  Age: 39 y.o. Sex: female      Admit Date: 1/12/2017    Discharge Date: 1/13/2017     Admitting Physician: Lamont Horvath MD     Attending Physician:  Tanya Gomez MD     Admission Diagnoses: pregnant  pregnant    Discharge Diagnoses:  No results found for: RUBELLAEXT, GRBSEXT    Immunization(s):   There is no immunization history on file for this patient. Hospital Course:    - Uterine Fibroid identified at area of concern. Patient Instructions:   Current Discharge Medication List      START taking these medications    Details   oxyCODONE-acetaminophen (PERCOCET) 5-325 mg per tablet Take 2 Tabs by mouth every four (4) hours as needed. Max Daily Amount: 12 Tabs. Qty: 30 Tab, Refills: 0         CONTINUE these medications which have NOT CHANGED    Details   prenatal multivit-ca-min-fe-fa tab Take 1 Tab by mouth daily. ferrous sulfate (IRON) 325 mg (65 mg iron) tablet Take 325 mg by mouth Daily (before breakfast). cholecalciferol (VITAMIN D3) 1,000 unit cap Take 1,000 Units by mouth daily. Sue Diallo is eating and doing well on Percocet. She requests discharge. US showed subserosal fibroid in area of the pain. Category 1 tracing  Not in labor. A: IUP at 32 wks 6 days with right upper quadrant pain  Subserosal fibroid identified on ultrasound  P: Dr Josh Martinez agrees with discharge  Discharge home. Keep appt in the office.        Signed By:  Candido Barker CNM     January 13, 2017

## 2017-01-13 NOTE — CONSULTS
Ed Bradleyo    Name:  Richie Green  MR#:  [id number]  :  1980  Account #:  [de-identified]  Date of Adm:  2017  Date of Consultation:  2017      ATTENDING PHYSICIAN: Casandra Taylor MD    CONSULTANT: Naseem Malcolm MD    HISTORY OF PRESENT ILLNESS: The patient is a 20-year-old  female who is about 35 weeks' gestation with her second pregnancy. She previously had a . We were asked to see the patient  because of right upper quadrant pain. The patient gives a history of  having had some low right back pain last night that was somewhat  crampy. She almost described it as like menstrual cramps. That pain  has since resolved. Through the night, the patient was somewhat  restless and did have some heat on her back, but again the pain  resolved. But then the patient got up this morning, ate some yogurt for  breakfast and went to school, she was having some very mild right  upper quadrant discomfort which gradually worsened through the day,  as she was in school. At about 2 o'clock she left school and did grab a  piece of pizza at that time and ate that, but she was already having  right upper quadrant pain that was more significant before eating  the pizza. She was not having nausea or vomiting, nor any significant  anorexia. Earlier this morning she had a fairly normal bowel  movement. The patient states that the pain seemed to be coming in waves and  that time would be more severe than others, but it has gradually  worsened to the point where when she came into labor and delivery,  she was in a significant amount of pain to the point of even screaming  somewhat. The patient denies any intermittent mild right upper quadrant  discomfort. She has no known gallbladder disease. However, her  mother and several maternal aunts have had gallbladder disease and  surgery for it. ALLERGIES: SULFA. MEDICATIONS AT HOME  1. Prenatal vitamins.   2. Iron.  3. Vitamin D3.  4. Occasional stool softener. PAST SURGICAL HISTORY: . SOCIAL HISTORY: The patient is , has 1 child at home. She  used to smoke, but has not smoked for quite a while. REVIEW OF SYSTEMS: No significant history of chronic  cardiovascular, respiratory, gastrointestinal or genitourinary problems. No history of neurologic, hematologic or endocrinologic problems other  than anemia. PHYSICAL EXAMINATION  VITAL SIGNS: Blood pressure was 110/67 earlier with a pulse of about  100. GENERAL: She is in no respiratory distress. She does appear to be in  mild discomfort at this point. She has had some Nubain, which  significantly relieved her pain. HEENT: Head, eyes, ears, nose and throat are normal.  NECK: Normal without adenopathy or masses. LUNGS: Fairly clear. HEART: Regular with no murmur or extra heart sounds heard. ABDOMEN: Shows a large gravid uterus, going up to approaching the  rib cage. She has tenderness in the right upper quadrant, but not  significantly in the rest of the abdomen. The area of point tenderness in  the right upper quadrant to me feels like a slightly swollen area that  feels like it is part of the uterus. Just above that, in the space between  the fundus of the uterus and the costal margin, there is mild  tenderness, but no guarding, rebound or mass. According to Dr. Luisana Dupont, the baby's heart is normal. There is no  apparent problem with the baby, and the ultrasound that she did failed  to show any obvious problem with the placenta, which appears to be  more toward the left side of the abdomen. The patient apparently does  have about a 5 cm intrauterine fibroid on the right upper quadrant area. LABORATORY DATA: The patient's urinalysis is normal. Her  hemoglobin is 10.8 with a white count of 12,100 and a slight left shift  with 84 neutrophils. The patient's electrolytes are normal. Her albumin  is low at 2.7.  Bilirubin is normal. Alkaline phosphatase is slightly  elevated at 137. Amylase and lipase are normal.    An ultrasound has been ordered to be done stat to evaluate the right  upper quadrant area. RECOMMENDATIONS: I believe the patient may have biliary colic. However, on my exam, it almost seems to me that there is an area in  the uterus that is quite tender and slightly swollen. This area will  probably be further evaluated with an ultrasound. At the present time, I  would not recommend any surgical intervention. I would continue to  treat the patient with the Nubain as needed for pain and continued  observation. We will see the results of the ultrasound when done. I appreciate the opportunity of being able to be involved in this patient's  care and we will continue to follow along, with one of my partners  probably seeing the patient tomorrow.         MD ROBERTO Grajeda / Allison Hair  D:  01/12/2017   19:14  T:  01/12/2017   20:13  Job #:  [id number]

## 2017-01-13 NOTE — PROGRESS NOTES
Pt. Seen and examined. See my dictated note. Possible biliary colic vs symptoms from uterine fibroid. Await U/S. Pain meds as needed.     Rebeca Urrutia MD  1/12/2017

## 2017-01-13 NOTE — PROGRESS NOTES
Patient to my office for ultrasound. US with fetus AGA and weight of 2050g (39%) Placenta normal and posterior. BPP 8/8. SDP 5.9 cm. Right fundal subserosal fibroid measuring 3.4x4.0x3.8 cm and matches area of pain/tenderness. On unit patient requesting discharge. Eating and tolerating oral percocet . Medically stable for discharge. Has f/u planned.      Pj Carter MD

## 2017-01-13 NOTE — PROGRESS NOTES
Progress Note    Patient: Nikki Haro MRN: [id number]  SSN: [id number]    YOB: 1980  Age: 39 y.o. Sex: female      Hospital Stay Day:  1    Ms. Rafael Bergman is a 39 y.o. female who was admitted for RUQ pain    Subjective:  Pain: 4  Pain Control: fair  Nausea: none  Flatus: NO  Other: nausea after pain meds-better now    Objective:  Visit Vitals    /73    Pulse (!) 103    Temp 98.6 °F (37 °C)    Resp 17    Ht 5' 4\" (1.626 m)    Wt 86.6 kg (191 lb)    Breastfeeding Yes    BMI 32.79 kg/m2       Temp (24hrs), Av.4 °F (36.9 °C), Min:98.2 °F (36.8 °C), Max:98.6 °F (37 °C)      No intake or output data in the 24 hours ending 17 0811          Recent Results (from the past 24 hour(s))   FETAL FIBRONECTIN    Collection Time: 17  4:14 PM   Result Value Ref Range    Fetal fibronectin NEGATIVE  NEG     POC URINE MACROSCOPIC    Collection Time: 17  5:03 PM   Result Value Ref Range    Color YELLOW      Appearance HAZY      Spec. gravity (POC) 1.025 1.003 - 1.030      pH, urine  (POC) 6.0 5.0 - 8.0      Protein (POC) NEGATIVE  NEG mg/dL    Glucose, urine (POC) NEGATIVE  NEG mg/dL    Ketones (POC) NEGATIVE  NEG mg/dL    Bilirubin (POC) NEGATIVE  NEG      Blood (POC) TRACE (A) NEG      Urobilinogen (POC) 0.2 0.2 - 1.0 EU/dL    Nitrite (POC) NEGATIVE  NEG      Leukocyte esterase (POC) NEGATIVE  NEG     POC URINE MACROSCOPIC    Collection Time: 17  5:06 PM   Result Value Ref Range    Color YELLOW      Appearance CLEAR      Spec. gravity (POC) 1.020 1.003 - 1.030      pH, urine  (POC) 7.0 5.0 - 8.0      Protein (POC) NEGATIVE  NEG mg/dL    Glucose, urine (POC) NEGATIVE  NEG mg/dL    Ketones (POC) TRACE (A) NEG mg/dL    Bilirubin (POC) NEGATIVE  NEG      Blood (POC) NEGATIVE  NEG      Urobilinogen (POC) 0.2 0.2 - 1.0 EU/dL    Nitrite (POC) NEGATIVE  NEG      Leukocyte esterase (POC) NEGATIVE  NEG     CBC WITH AUTOMATED DIFF    Collection Time: 17  5:47 PM   Result Value Ref Range    WBC 12.1 4.6 - 13.2 K/uL    RBC 3.90 (L) 4.20 - 5.30 M/uL    HGB 10.8 (L) 12.0 - 16.0 g/dL    HCT 32.2 (L) 35.0 - 45.0 %    MCV 82.6 74.0 - 97.0 FL    MCH 27.7 24.0 - 34.0 PG    MCHC 33.5 31.0 - 37.0 g/dL    RDW 14.2 11.6 - 14.5 %    PLATELET 553 581 - 946 K/uL    MPV 9.8 9.2 - 11.8 FL    NEUTROPHILS 84 (H) 40 - 73 %    LYMPHOCYTES 10 (L) 21 - 52 %    MONOCYTES 6 3 - 10 %    EOSINOPHILS 0 0 - 5 %    BASOPHILS 0 0 - 2 %    ABS. NEUTROPHILS 10.1 (H) 1.8 - 8.0 K/UL    ABS. LYMPHOCYTES 1.2 0.9 - 3.6 K/UL    ABS. MONOCYTES 0.8 0.05 - 1.2 K/UL    ABS. EOSINOPHILS 0.0 0.0 - 0.4 K/UL    ABS. BASOPHILS 0.0 0.0 - 0.06 K/UL    DF AUTOMATED     METABOLIC PANEL, COMPREHENSIVE    Collection Time: 01/12/17  5:47 PM   Result Value Ref Range    Sodium 137 136 - 145 mmol/L    Potassium 3.7 3.5 - 5.5 mmol/L    Chloride 101 100 - 108 mmol/L    CO2 24 21 - 32 mmol/L    Anion gap 12 3.0 - 18 mmol/L    Glucose 91 74 - 99 mg/dL    BUN 7 7.0 - 18 MG/DL    Creatinine 0.62 0.6 - 1.3 MG/DL    BUN/Creatinine ratio 11 (L) 12 - 20      GFR est AA >60 >60 ml/min/1.73m2    GFR est non-AA >60 >60 ml/min/1.73m2    Calcium 9.0 8.5 - 10.1 MG/DL    Bilirubin, total 0.5 0.2 - 1.0 MG/DL    ALT 19 13 - 56 U/L    AST 14 (L) 15 - 37 U/L    Alk. phosphatase 137 (H) 45 - 117 U/L    Protein, total 6.7 6.4 - 8.2 g/dL    Albumin 2.7 (L) 3.4 - 5.0 g/dL    Globulin 4.0 2.0 - 4.0 g/dL    A-G Ratio 0.7 (L) 0.8 - 1.7     AMYLASE    Collection Time: 01/12/17  5:47 PM   Result Value Ref Range    Amylase 54 25 - 115 U/L   LIPASE    Collection Time: 01/12/17  5:47 PM   Result Value Ref Range    Lipase 115 73 - 393 U/L   TYPE & SCREEN    Collection Time: 01/12/17  5:47 PM   Result Value Ref Range    Crossmatch Expiration 01/15/2017     ABO/Rh(D) O POSITIVE     Antibody screen NEG        Abdomen: soft, tender RUQ. Bowel sounds normal. No masses,  no organomegaly  Bowel Sounds: normal character  Other: U/S only shows sludge.  LFT's nl    ASSESSMENT: Positives are checked    [x]   stable   []  deconditioning     []   doing well   []  need for better pain control      []   no apparent complications   []  ready for discharge     []   ileus   []  needs mobilization     []   wound infection      RUQ pain-doubt gall bladder as source    RECOMMENDATIONS: Positives are checked    []   discharge   []  heparin Lock IV     []   mobilize   []  insert NG     []   Increase diet   []  discontinue NG     []   npo   []  transfer to ICU     []   encourage tri-flow   []  transfer to Floor     []   adjust pain medication   []  chest X-Ray     []   PT and OT   []  abdominal X-Ray     []   decrease IV rate   [x] order lab work   Observe, herminio Urrutia MD  January 13, 2017  8:11 AM

## 2017-01-13 NOTE — ROUTINE PROCESS
Bedside and Verbal shift change report given to MOON Bingham RN (oncoming nurse) by Richard Ayon. Khushi Nelson RN (offgoing nurse). Report included the following information SBAR, Intake/Output, MAR and Recent Results.

## 2017-01-13 NOTE — PROGRESS NOTES
OB Progress Note    S: Pt reports pain minimal compared to admission. Still some minor waves of pain. Uterus sore to touch on top on right. O: Patient Vitals for the past 4 hrs:   Temp   01/12/17 1941 (P) 98.6 °F (37 °C)            Abd:  Soft,  Tender on top of fundus on the right only minimally       TOCO: q 5 minutes not palpable       FHT: reative without decels          A/P: IUP at  [id number] with RUQ pain. W/U neg with neg RUQ US except some GB sludge. All labs normal except left shift. Doubt Cholecystitis or surgical abdomen. Could have some biliary colic but much better at this point. Will give another dose of pain meds to help patient sleep and re-evaluate in AM.  Feel most likely will be able to go home. Has F/U appt in the office in 2 weeks and will see if US can be added. Questions answered.                          Mallory Tracey MD  January 12, 2017

## 2017-01-13 NOTE — PROGRESS NOTES
1925 report rec'd from sherie martínez rn  2008 efm off--pt assisted up to br  2020  Returned to bed, efm resumed     2021  Us relocated   fhr 140  2029 efm off for us tech to do bedside portable us  2100 efm resumed w/ pt on left lateral   Us & toco relocated   fhr  145  2153 dr Faby Oscar called in to check on pt's status---informed her of pt c/o generalized abd tenderness w/ abd palpation when relocating toco, abd soft, ucs q3-5.5min mild. Dr Davene Baumgarten she will come to eval pt  2215 dr Faby Oscar at bedside to eval pt  1/13/17 0145 efm off , pt assisted up to br. Pt states she is not hurting except when her abdomen is touched  0500 pt repositioned herself onto rt lateral  0511 us not making contact w/ fhts.    Us and toco relocated  3307  fhr 95 [id number] us not making continuous contact w/ fhts---us relocated  32 82 12 report given to edwardo reyes rn

## 2017-01-13 NOTE — PROGRESS NOTES
Spoke with Dr. Marla Lima re: upgrading status to Observation. She is in agreement. Verbal order for OBS obtained and placed. Notified [facility] team of my intervention // NEHAL Painting RN

## 2017-01-13 NOTE — PROGRESS NOTES
Antepartum progress note    Patient seen, fetal heart rate and contraction pattern evaluated, patient examined. Slept comfortably through the night. Had one dose of nubain this morning. Rates pain as a 1. Denies nausea or vomiting. Says she is hungry. Good fetal movement.   Patient Vitals for the past 8 hrs:   Temp Pulse Resp BP   01/13/17 0715 98.6 °F (37 °C) (!) 103 17 114/73         Recent Results (from the past 24 hour(s))   FETAL FIBRONECTIN    Collection Time: 01/12/17  4:14 PM   Result Value Ref Range    Fetal fibronectin NEGATIVE  NEG     POC URINE MACROSCOPIC    Collection Time: 01/12/17  5:03 PM   Result Value Ref Range    Color YELLOW      Appearance HAZY      Spec. gravity (POC) 1.025 1.003 - 1.030      pH, urine  (POC) 6.0 5.0 - 8.0      Protein (POC) NEGATIVE  NEG mg/dL    Glucose, urine (POC) NEGATIVE  NEG mg/dL    Ketones (POC) NEGATIVE  NEG mg/dL    Bilirubin (POC) NEGATIVE  NEG      Blood (POC) TRACE (A) NEG      Urobilinogen (POC) 0.2 0.2 - 1.0 EU/dL    Nitrite (POC) NEGATIVE  NEG      Leukocyte esterase (POC) NEGATIVE  NEG     POC URINE MACROSCOPIC    Collection Time: 01/12/17  5:06 PM   Result Value Ref Range    Color YELLOW      Appearance CLEAR      Spec. gravity (POC) 1.020 1.003 - 1.030      pH, urine  (POC) 7.0 5.0 - 8.0      Protein (POC) NEGATIVE  NEG mg/dL    Glucose, urine (POC) NEGATIVE  NEG mg/dL    Ketones (POC) TRACE (A) NEG mg/dL    Bilirubin (POC) NEGATIVE  NEG      Blood (POC) NEGATIVE  NEG      Urobilinogen (POC) 0.2 0.2 - 1.0 EU/dL    Nitrite (POC) NEGATIVE  NEG      Leukocyte esterase (POC) NEGATIVE  NEG     CBC WITH AUTOMATED DIFF    Collection Time: 01/12/17  5:47 PM   Result Value Ref Range    WBC 12.1 4.6 - 13.2 K/uL    RBC 3.90 (L) 4.20 - 5.30 M/uL    HGB 10.8 (L) 12.0 - 16.0 g/dL    HCT 32.2 (L) 35.0 - 45.0 %    MCV 82.6 74.0 - 97.0 FL    MCH 27.7 24.0 - 34.0 PG    MCHC 33.5 31.0 - 37.0 g/dL    RDW 14.2 11.6 - 14.5 %    PLATELET 324 382 - 392 K/uL    MPV 9.8 9.2 - 11.8 FL    NEUTROPHILS 84 (H) 40 - 73 %    LYMPHOCYTES 10 (L) 21 - 52 %    MONOCYTES 6 3 - 10 %    EOSINOPHILS 0 0 - 5 %    BASOPHILS 0 0 - 2 %    ABS. NEUTROPHILS 10.1 (H) 1.8 - 8.0 K/UL    ABS. LYMPHOCYTES 1.2 0.9 - 3.6 K/UL    ABS. MONOCYTES 0.8 0.05 - 1.2 K/UL    ABS. EOSINOPHILS 0.0 0.0 - 0.4 K/UL    ABS. BASOPHILS 0.0 0.0 - 0.06 K/UL    DF AUTOMATED     METABOLIC PANEL, COMPREHENSIVE    Collection Time: 01/12/17  5:47 PM   Result Value Ref Range    Sodium 137 136 - 145 mmol/L    Potassium 3.7 3.5 - 5.5 mmol/L    Chloride 101 100 - 108 mmol/L    CO2 24 21 - 32 mmol/L    Anion gap 12 3.0 - 18 mmol/L    Glucose 91 74 - 99 mg/dL    BUN 7 7.0 - 18 MG/DL    Creatinine 0.62 0.6 - 1.3 MG/DL    BUN/Creatinine ratio 11 (L) 12 - 20      GFR est AA >60 >60 ml/min/1.73m2    GFR est non-AA >60 >60 ml/min/1.73m2    Calcium 9.0 8.5 - 10.1 MG/DL    Bilirubin, total 0.5 0.2 - 1.0 MG/DL    ALT 19 13 - 56 U/L    AST 14 (L) 15 - 37 U/L    Alk. phosphatase 137 (H) 45 - 117 U/L    Protein, total 6.7 6.4 - 8.2 g/dL    Albumin 2.7 (L) 3.4 - 5.0 g/dL    Globulin 4.0 2.0 - 4.0 g/dL    A-G Ratio 0.7 (L) 0.8 - 1.7     AMYLASE    Collection Time: 01/12/17  5:47 PM   Result Value Ref Range    Amylase 54 25 - 115 U/L   LIPASE    Collection Time: 01/12/17  5:47 PM   Result Value Ref Range    Lipase 115 73 - 393 U/L   TYPE & SCREEN    Collection Time: 01/12/17  5:47 PM   Result Value Ref Range    Crossmatch Expiration 01/15/2017     ABO/Rh(D) O POSITIVE     Antibody screen NEG        Physical Exam:  Abdomen: fundus firm. Mild tenderness on right side. No rebound or guarding. Membranes:  Intact  Uterine Activity: None  Fetal Heart Rate: Reactive    Assessment/Plan:    IUP at 32.6 weeks  RUQ pain, acute, yesterday and improved today  Sludge in gallbladder but no signs of cholecystitis    Repeat WBC today  Will try po pain meds  Advance diet as tolerated  Plan on OB US in my office today.        Dena Robertson MD

## 2017-02-09 LAB — GRBS, EXTERNAL: NORMAL

## 2017-03-01 ENCOUNTER — HOSPITAL ENCOUNTER (INPATIENT)
Age: 37
LOS: 2 days | Discharge: HOME | End: 2017-03-04
Attending: OBSTETRICS & GYNECOLOGY | Admitting: OBSTETRICS & GYNECOLOGY
Payer: COMMERCIAL

## 2017-03-01 ENCOUNTER — HOSPITAL ENCOUNTER (EMERGENCY)
Age: 37
Discharge: HOME | End: 2017-03-01
Attending: OBSTETRICS & GYNECOLOGY | Admitting: OBSTETRICS & GYNECOLOGY
Payer: COMMERCIAL

## 2017-03-01 VITALS
TEMPERATURE: 98.6 F | BODY MASS INDEX: 33.99 KG/M2 | HEART RATE: 98 BPM | RESPIRATION RATE: 16 BRPM | WEIGHT: 198 LBS | DIASTOLIC BLOOD PRESSURE: 68 MMHG | SYSTOLIC BLOOD PRESSURE: 128 MMHG

## 2017-03-01 PROBLEM — O09.529 AMA (ADVANCED MATERNAL AGE) MULTIGRAVIDA 35+: Status: ACTIVE | Noted: 2017-03-01

## 2017-03-01 PROBLEM — O99.013 ANEMIA AFFECTING PREGNANCY IN THIRD TRIMESTER: Status: ACTIVE | Noted: 2017-03-01

## 2017-03-01 PROBLEM — Z98.891 H/O CESAREAN SECTION: Status: ACTIVE | Noted: 2017-03-01

## 2017-03-01 PROBLEM — D21.9 FIBROID: Status: ACTIVE | Noted: 2017-03-01

## 2017-03-01 PROCEDURE — 85025 COMPLETE CBC W/AUTO DIFF WBC: CPT | Performed by: ADVANCED PRACTICE MIDWIFE

## 2017-03-01 PROCEDURE — 74011250636 HC RX REV CODE- 250/636: Performed by: OBSTETRICS & GYNECOLOGY

## 2017-03-01 PROCEDURE — 96365 THER/PROPH/DIAG IV INF INIT: CPT

## 2017-03-01 PROCEDURE — 59025 FETAL NON-STRESS TEST: CPT

## 2017-03-01 PROCEDURE — 99283 EMERGENCY DEPT VISIT LOW MDM: CPT

## 2017-03-01 PROCEDURE — 96375 TX/PRO/DX INJ NEW DRUG ADDON: CPT

## 2017-03-01 PROCEDURE — 77030020255 HC SOL INJ LR 1000ML BG

## 2017-03-01 PROCEDURE — 74011250636 HC RX REV CODE- 250/636: Performed by: ADVANCED PRACTICE MIDWIFE

## 2017-03-01 PROCEDURE — 74011000250 HC RX REV CODE- 250: Performed by: OBSTETRICS & GYNECOLOGY

## 2017-03-01 PROCEDURE — 86850 RBC ANTIBODY SCREEN: CPT | Performed by: ADVANCED PRACTICE MIDWIFE

## 2017-03-01 PROCEDURE — 96361 HYDRATE IV INFUSION ADD-ON: CPT

## 2017-03-01 PROCEDURE — 96360 HYDRATION IV INFUSION INIT: CPT

## 2017-03-01 PROCEDURE — 96374 THER/PROPH/DIAG INJ IV PUSH: CPT

## 2017-03-01 RX ORDER — OXYTOCIN/RINGER'S LACTATE 20/1000 ML
500 PLASTIC BAG, INJECTION (ML) INTRAVENOUS ONCE
Status: DISCONTINUED | OUTPATIENT
Start: 2017-03-02 | End: 2017-03-02

## 2017-03-01 RX ORDER — SODIUM CHLORIDE, SODIUM LACTATE, POTASSIUM CHLORIDE, CALCIUM CHLORIDE 600; 310; 30; 20 MG/100ML; MG/100ML; MG/100ML; MG/100ML
125 INJECTION, SOLUTION INTRAVENOUS CONTINUOUS
Status: DISCONTINUED | OUTPATIENT
Start: 2017-03-01 | End: 2017-03-01 | Stop reason: HOSPADM

## 2017-03-01 RX ORDER — TERBUTALINE SULFATE 1 MG/ML
0.25 INJECTION SUBCUTANEOUS
Status: DISCONTINUED | OUTPATIENT
Start: 2017-03-01 | End: 2017-03-02

## 2017-03-01 RX ORDER — HYDROMORPHONE HYDROCHLORIDE 1 MG/ML
1 INJECTION, SOLUTION INTRAMUSCULAR; INTRAVENOUS; SUBCUTANEOUS
Status: DISCONTINUED | OUTPATIENT
Start: 2017-03-01 | End: 2017-03-02

## 2017-03-01 RX ORDER — METHYLERGONOVINE MALEATE 0.2 MG/ML
0.2 INJECTION INTRAVENOUS AS NEEDED
Status: DISCONTINUED | OUTPATIENT
Start: 2017-03-01 | End: 2017-03-02

## 2017-03-01 RX ORDER — CASTOR OIL 100 %
30 OIL (ML) ORAL ONCE
Status: DISCONTINUED | OUTPATIENT
Start: 2017-03-02 | End: 2017-03-02

## 2017-03-01 RX ORDER — SODIUM CHLORIDE, SODIUM LACTATE, POTASSIUM CHLORIDE, CALCIUM CHLORIDE 600; 310; 30; 20 MG/100ML; MG/100ML; MG/100ML; MG/100ML
125 INJECTION, SOLUTION INTRAVENOUS CONTINUOUS
Status: DISCONTINUED | OUTPATIENT
Start: 2017-03-02 | End: 2017-03-02

## 2017-03-01 RX ORDER — HYDROMORPHONE HYDROCHLORIDE 2 MG/ML
2 INJECTION, SOLUTION INTRAMUSCULAR; INTRAVENOUS; SUBCUTANEOUS ONCE
Status: COMPLETED | OUTPATIENT
Start: 2017-03-01 | End: 2017-03-01

## 2017-03-01 RX ORDER — DOCUSATE SODIUM 100 MG/1
100 CAPSULE, LIQUID FILLED ORAL
COMMUNITY

## 2017-03-01 RX ORDER — LIDOCAINE HYDROCHLORIDE 10 MG/ML
20 INJECTION, SOLUTION EPIDURAL; INFILTRATION; INTRACAUDAL; PERINEURAL AS NEEDED
Status: DISCONTINUED | OUTPATIENT
Start: 2017-03-01 | End: 2017-03-02

## 2017-03-01 RX ORDER — OXYTOCIN/RINGER'S LACTATE 20/1000 ML
125 PLASTIC BAG, INJECTION (ML) INTRAVENOUS CONTINUOUS
Status: DISCONTINUED | OUTPATIENT
Start: 2017-03-02 | End: 2017-03-02

## 2017-03-01 RX ORDER — BUTORPHANOL TARTRATE 1 MG/ML
2 INJECTION INTRAMUSCULAR; INTRAVENOUS
Status: DISCONTINUED | OUTPATIENT
Start: 2017-03-01 | End: 2017-03-02

## 2017-03-01 RX ORDER — NALBUPHINE HYDROCHLORIDE 10 MG/ML
10 INJECTION, SOLUTION INTRAMUSCULAR; INTRAVENOUS; SUBCUTANEOUS
Status: DISCONTINUED | OUTPATIENT
Start: 2017-03-01 | End: 2017-03-02

## 2017-03-01 RX ADMIN — HYDROMORPHONE HYDROCHLORIDE 2 MG: 2 INJECTION INTRAMUSCULAR; INTRAVENOUS; SUBCUTANEOUS at 04:34

## 2017-03-01 RX ADMIN — SODIUM CHLORIDE 25 MG: 9 INJECTION INTRAMUSCULAR; INTRAVENOUS; SUBCUTANEOUS at 04:18

## 2017-03-01 RX ADMIN — SODIUM CHLORIDE, SODIUM LACTATE, POTASSIUM CHLORIDE, AND CALCIUM CHLORIDE 125 ML/HR: 600; 310; 30; 20 INJECTION, SOLUTION INTRAVENOUS at 03:40

## 2017-03-01 RX ADMIN — SODIUM CHLORIDE, SODIUM LACTATE, POTASSIUM CHLORIDE, AND CALCIUM CHLORIDE 125 ML/HR: 600; 310; 30; 20 INJECTION, SOLUTION INTRAVENOUS at 05:39

## 2017-03-01 NOTE — IP AVS SNAPSHOT
Palma Rodriguez 
 
 
 [address] [place] [address] Patient: Marilyn Spencer MRN: [id number] You are allergic to the following Allergen Reactions Sulfa (Sulfonamide Antibiotics) Hives Recent Documentation Height Weight Breastfeeding? BMI OB Status Smoking Status 1.626 m 89.8 kg Yes 33.99 kg/m2 Recent pregnancy Former Smoker Emergency Contacts Name Discharge Info Relation Home Work Mobile [employer] DISCHARGE CAREGIVER [3] Boyfriend [17] [phone] Marina Mayo YES [1]  [phone] About your hospitalization You were admitted on:  2017 You last received care in the:  [place] [address] You were discharged on:  2017 Unit phone number:  [phone] Why you were hospitalized Your primary diagnosis was:  Labor And Delivery Indication For Care Or Intervention Your diagnoses also included:  H/O  Section, Anemia Affecting Pregnancy In Third Trimester, Fibroid, [place] (Advanced Maternal Age) Multigravida 35+, Anemia Providers Seen During Your Hospitalizations Provider Role Specialty Primary office phone Jatinder Yanes MD Attending Provider Obstetrics & Gynecology [phone] Your Primary Care Physician (PCP) Primary Care Physician Office Phone Office Fax Adailana German [phone] Follow-up Information Follow up With Details Comments Contact Info Saint Moos, MD   [address] [facility] [address] [facility] [id number] [phone] Jatinder Yanes MD In 6 weeks Post Partum Follow Up  [facility] [address] [place] [address] [phone] Current Discharge Medication List  
  
START taking these medications Dose & Instructions Dispensing Information Comments Morning Noon Evening Bedtime ibuprofen 800 mg tablet Commonly known as:  MOTRIN Your next dose is: Today, Tomorrow Other:  _________ Dose:  800 mg Take 1 Tab by mouth every eight (8) hours as needed. Quantity:  60 Tab Refills:  1  
     
   
   
   
  
 oxyCODONE-acetaminophen 5-325 mg per tablet Commonly known as:  PERCOCET Your next dose is: Today, Tomorrow Other:  _________ Dose:  1-2 Tab Take 1-2 Tabs by mouth every six (6) hours as needed. Max Daily Amount: 8 Tabs. Quantity:  30 Tab Refills:  0 CONTINUE these medications which have NOT CHANGED Dose & Instructions Dispensing Information Comments Morning Noon Evening Bedtime COLACE 100 mg capsule Generic drug:  docusate sodium Your next dose is: Today, Tomorrow Other:  _________ Dose:  100 mg Take 100 mg by mouth two (2) times daily as needed for Constipation. Refills:  0  
     
   
   
   
  
 prenatal multivit-ca-min-fe-fa Tab Your next dose is: Today, Tomorrow Other:  _________ Dose:  1 Tab Take 1 Tab by mouth daily. Refills:  0  
     
   
   
   
  
 VITAMIN D3 1,000 unit Cap Generic drug:  cholecalciferol Your next dose is: Today, Tomorrow Other:  _________ Dose:  1000 Units Take 1,000 Units by mouth daily. Refills:  0 STOP taking these medications Iron 325 mg (65 mg iron) tablet Generic drug:  ferrous sulfate Where to Get Your Medications Information on where to get these meds will be given to you by the nurse or doctor. ! Ask your nurse or doctor about these medications  
  ibuprofen 800 mg tablet  
 oxyCODONE-acetaminophen 5-325 mg per tablet Discharge Instructions CONGRATULATIONS ON THE BIRTH OF YOUR BABY!  
The first six weeks after childbirth is a time of physical and emotional adjustment. This handout will help to answer questions and provide guidance during the postpartum period. Every family's adjustment is unique, so please call if you have further concerns. At anytime we can be reached at [phone]. During office hours please ask to speak to a charge nurse. After hours, the answering service will take a message and the Nurse-Midwife on-call will return your call. If your question can wait until office hours: Monday-Friday 8:30-4:00, please do so. For emergencies or urgent concerns do not hesitate to call us after hours. DIET Your body is in need of a well-balanced, high protein diet to recuperate from birth. Please continue to take your prenatal vitamins for 6 weeks or as long as you are breastfeeding. Continue to drink at least 6-8 cups of water or other liquid a day. A breastfeeding mother also needs extra protein, calories and calcium containing foods. It is a good rule to drink fluids with every feeding in order to maintain an adequate milk supply and avoid dehydration. Your baby will probably not be bothered by things in your diet, but if the baby seems extremely fussy or develops a rash, you may want to discuss possible food intolerances with your baby's care provider. PAIN MEDICATIONS Acetaminophen (Tylenol), ibuprofen (Motrin), or other prescribed pain medication may be taken as directed to relieve discomfort. The above medications pass in very minimal amounts into the breast milk and usually will not cause problems. There are medications that may affect the baby, so please consult your baby's care provider before taking medication. If you are breastfeeding, be sure to mention this to any care provider you see so that medications that are safe may be selected. There is an excellent resource called [employer] that is a resource for medication safety in pregnancy and lactation.   You can visit their website at [email or web address] or call them toll free at [phone] if you have any questions about medication safety. UTERINE INVOLUTION / VAGINAL BLEEDING Involution is the process of the uterus returning to pre-pregnant size. It will take approximately six weeks for this process to occur. To achieve this size your uterus becomes firm to slow bleeding loss from the placental site. The first 7 days after birth, the bleeding is red and heavy. It may change with your activity and position. Some small clots are normal.   After ten days, the bleeding should be pale pink and slowed considerably. The next several weeks may progress to a pink, mucousy discharge. This may continue for 6-8 weeks, depending on your activity. During the first four weeks after delivery we recommend using sanitary pads instead of tampons. Douching should also be avoided, but it is fine to take a tub bath so long as the tub is very clean. ACTIVITY/EXERCISE Adequate rest is essential to recovery. Try to rest or sleep when the baby sleeps. After two weeks, you may begin going for short walks, doing Kegel exercises and abdominal crunches. Avoid heavy, jarring or aerobic exercises. Remember to start out slowly and build up to your previous fitness level. Use common sense and don't overdo as rest is important and the benefits of increased rest are a quicker recovery. For the first two weeks after a  try to limit trips up or down steps. Do not lift anything heavier than the baby during this time. Lifting the baby or other objects should be done by bending at the knees rather than the waist.  Driving should be avoided during the first two to three weeks until you have the strength to push firmly on the brakes in case of an emergency. You may ride as a passenger, but DO wear a seat belt at all times.   
 
After a few weeks, you may resume normal activity at whatever pace is comfortable for you. Exercise may also be resumed gradually. Walking is a good way to start. Finally, try to be reasonable in your expectations. Caring for a new baby after major surgery can be quite trying. Arrange for assistance at home to ensure that you get enough rest.  
 
POSTPARTUM CHECK You may call the office when you return home to set up a postpartum visit. Most patients will be seen at 6 weeks after delivery, but after a  or other circumstances you may be seen in 2 weeks or less. If you are discharged from the hospital with staples that must be removed, you will be asked to come in sooner. At your postpartum visit, a pelvic exam may be performed. If you are having any problems or concerns, please do not hesitate to call. Once again our number is [phone]. MOOD CHANGES Significant hormonal changes occur in the days following delivery, and as a result, many women experience brief episodes of tearfulness or feeling \"blue. \"  These emotional swings may be made worse by lack of sleep and by the adjustments inherent in becoming a mother. For some women, these fluctuations are minor. For others, they are overwhelming; creating feelings of anxiety, depression, or the inability to cope. If you have difficulty functioning as a result of feeling down, or if the mood changes seem severe, do not improve, or result is thoughts of harming yourself or others CALL RIGHT AWAY. PERINEAL CARE The basic goals of perineal care are to prevent infection, to relieve pain and promote healing. Your stitches will dissolve in four to six weeks, and do not need to be removed. After urinating, please continue to clean with warm water from front to back. Please continue sitz baths as instructed twice a day for a week or as needed. Call the office if you see pus in the suture site, or have unusual or severe swelling or pain that seems to be getting worse.   
 
 
 
 
 
INCISION CARE 
 If you had a , clean and dry the incision gently as you would the rest of your body. Washing over the area with soap and water, and showering are fine. If steri-strips are present they will gradually come off with time. Tub baths are permitted. You may experience numbness and burning in the area surrounding the incision which usually resolves gradually over the next several weeks or months. RETURN OF MENSTRUATION Your first menstrual period may occur as soon as four to six weeks after your delivery if you are not breast-feeding. If breast-feeding it is more difficult to predict when your first period will occur. Even if you are not yet menstruating, you may be ovulating and it may be possible to conceive again. It is common for your first period after childbirth to be very heavy with an increased amount of cramping. BREASTS Breast-feeding Mothers: Colostrum is excreted in the first 24-72 hours. Mature breast milk will appear on the 2nd to 5th day. Engorgement may occur with the mature milk making your breasts feel warm and very full. Frequent feedings will make you more comfortable. Babies do not nurse on regular schedules. Nursing every 1 1/2 to 2 hours is normal and frequent feeding DOES NOT mean you are not making enough milk. To avoid nipple confusion, do not give bottles for the first 4 weeks. Growth spurts are common and may require more frequent feedings. This is the way baby increases your milk supply. During a growth spurt, you may feel you are feeding very frequently and that your breasts are \"empty. \"  Don't worry, your milk is produced by supply and demand so this increased frequency of feeding will increase your milk supply within 48 hours. Sore nipples may occur with frequent feedings and are sometimes also caused by improper latch. Check for a proper latch. Baby should have a wide open mouth. Use different positions at each feeding if possible.   Express a small amount of colostrum or breast milk onto the sore area and leave bra flaps unlatched until dry. The lactation consultant at [facility] is available for outpatient consultation without charge. Call [phone] from Monday-Friday 9:00am- 3:00pm to arrange an outpatient appointment with her. Local [facility] Group and consultants may also be very helpful. If You Are Not Breast-feeding: You will experience swelling, engorgement and some milk production. There are no safe medications available to stop lactation. Some remedies for engorgement include: wearing a tight bra, ice packs and cold green cabbage leaves placed between the breast and your bra. Change these frequently. Tylenol or Motrin should help with the discomfort. SEXUAL ADJUSTMENTS We recommend that you wait at least four weeks before resuming sexual intercourse. A sore perineum, a demanding baby and fatigue will certainly affect your ability to enjoy lovemaking! A vaginal lubricant is recommended to help with any dryness. It is very important to remember that you will ovulate BEFORE your first period and can conceive. If you do not wish another pregnancy right away, please take precautions to avoid pregnancy. If you would like a prescription method of birth control, please discuss this with us at your 6 week visit. ELIMINATION We remind all postpartum patients that it may take a few days for your bowels to return to normal, especially if you had a long labor. For those who had C-sections or severe lacerations, we recommend that you use a stool softener twice daily for at least two weeks. Many stool softeners are over-the-counter. Colace (Docusate Sodium) is recommended. Bulk forming agents such as Metamucil or Fibercon may be used daily in addition to a stool softener to promote regular bowel movements. Eating fresh fruits and vegetables along with whole grains is helpful as well.   Do not be afraid to have a bowel movement as your stitches will not \"come out\" in the course of having a bowel movement. Urination may be difficult due to soreness around the urethra, or as an after effect of epidural.  This is temporary and can be helped  by squirting water over the perineum or try going in the shower. Hemorrhoids are common after birth. Tucks pads, Anusol cream and avoiding constipation are helpful. If constipation does occur, you may take Milk of Magnesia or [place] according to the package instructions. DANGER SIGNS! CALL WITHOUT DELAY IF YOU ARE EXPERIENCING ANY OF THE FOLLOWING: 
* Unusually heavy bleeding, soaking more than 1 or more pads in an hour. * Vaginal discharge with strong foul odor. * Fever of 101 or higher * Unusual pain or tenderness in the abdominal area. * If breasts are red, hot or have a painful lump. * Depression that persists longer than 1-2 weeks or is severe. * Any urinary frequency accompanied by urgency or pain. * A lump in leg or calf especially if painful, warm or red. We thank you for choosing us for your prenatal care and/or delivery. We wish you all happiness and health with your baby for his or her lifetime! Óscar Chadwick CNM Discharge Instructions Attachments/References DEPRESSION: POSTPARTUM (ENGLISH) BREASTFEEDING (ENGLISH) Discharge Orders None [facility] Announcement We are excited to announce that we are making your provider's discharge notes available to you in Meeting[employer] Software. You will see these notes when they are completed and signed by the physician that discharged you from your recent hospital stay. If you have any questions or concerns about any information you see in Meeting[employer] Software, please call the Health Information Department where you were seen or reach out to your Primary Care Provider for more information about your plan of care. Introducing [facility]! Daiana Finn introduces Asuum patient portal. Now you can access parts of your medical record, email your doctor's office, and request medication refills online. 1. In your internet browser, go to [email or web address] 2. Click on the First Time User? Click Here link in the Sign In box. You will see the New Member Sign Up page. 3. Enter your Asuum Access Code exactly as it appears below. You will not need to use this code after youve completed the sign-up process. If you do not sign up before the expiration date, you must request a new code. · Asuum Access Code: [facility] Expires: 4/13/2017  4:07 PM 
 
4. Enter the last four digits of your Social Security Number (xxxx) and Date of Birth (mm/dd/yyyy) as indicated and click Submit. You will be taken to the next sign-up page. 5. Create a Asuum ID. This will be your Asuum login ID and cannot be changed, so think of one that is secure and easy to remember. 6. Create a Asuum password. You can change your password at any time. 7. Enter your Password Reset Question and Answer. This can be used at a later time if you forget your password. 8. Enter your e-mail address. You will receive e-mail notification when new information is available in [address] 9. Click Sign Up. You can now view and download portions of your medical record. 10. Click the Download Summary menu link to download a portable copy of your medical information. If you have questions, please visit the Frequently Asked Questions section of the Asuum website. Remember, Asuum is NOT to be used for urgent needs. For medical emergencies, dial 911. Now available from your iPhone and Android! General Information Please provide this summary of care documentation to your next provider. Patient Signature:  ____________________________________________________________ Date:  ____________________________________________________________  
  
Gina Sleight Provider Signature:  ____________________________________________________________ Date:  ____________________________________________________________ More Information Depression After Childbirth: Care Instructions Your Care Instructions Many women get the \"baby blues\" during the first few days after childbirth. You may lose sleep, feel irritable, and cry easily. You may feel happy one minute and sad the next. Hormone changes are one cause of these emotional changes. Also, the demands of a new baby, along with visits from relatives or other family needs, add to a mother's stress. The \"baby blues\" often peak around the fourth day. Then they ease up in less than 2 weeks. If your moodiness or anxiety lasts for more than 2 weeks, or if you feel like life is not worth living, you may have postpartum depression. This is different for each mother. Some mothers with serious depression may worry intensely about their infant's well-being. Others may feel distant from their child. Some mothers might even feel that they might harm their baby. A mother may have signs of paranoia, wondering if someone is watching her. Depression is not a sign of weakness. It is a medical condition that requires treatment. Medicine and counseling often work well to reduce depression. Talk to your doctor about taking antidepressant medicine while breastfeeding. Follow-up care is a key part of your treatment and safety. Be sure to make and go to all appointments, and call your doctor if you are having problems. It's also a good idea to know your test results and keep a list of the medicines you take. How do you know if you are depressed? With all the changes in your life, you may not know if you are depressed. Pregnancy sometimes causes changes in how you feel that are similar to the symptoms of depression. Symptoms of depression include: · Feeling sad or hopeless and losing interest in daily activities. These are the most common symptoms of depression. · Sleeping too much or not enough. · Feeling tired. You may feel as if you have no energy. · Eating too much or too little. · Writing or talking about death, such as writing suicide notes or talking about guns, knives, or pills. Keep the numbers for these national suicide hotlines: -TALK (2-804.791.5589) and 6-607-EKBHOWY (3-485.641.5291). If you or someone you know talks about suicide or feeling hopeless, get help right away. How can you care for yourself at home? · Be safe with medicines. Take your medicines exactly as prescribed. Call your doctor if you think you are having a problem with your medicine. · Eat a healthy diet so that you can keep up your energy. · Get regular daily exercise, such as walks, to help improve your mood. · Get as much sunlight as possible. Keep your shades and curtains open. Get outside as much as you can. · Avoid using alcohol or other substances to feel better. · Get as much rest and sleep as possible. Avoid doing too much. Being too tired can increase depression. · Play stimulating music throughout your day and soothing music at night. · Schedule outings and visits with friends and family. Ask them to call you regularly, so that you do not feel alone. · Ask for help with preparing food and other daily tasks. Family and friends are often happy to help a mother with a . · Be honest with yourself and those who care about you. Tell them about your struggle. · Join a support group of new mothers. No one can better understand the challenges of caring for a  than other new mothers. · If you feel like life is not worth living or are feeling hopeless, get help right away. Keep the numbers for these national suicide hotlines: -TALK (1-473.974.6802) and 7-300-ZVREKEE (3-231.192.5197). When should you call for help? Call 911 anytime you think you may need emergency care. For example, call if: 
· You feel you cannot stop from hurting yourself, your baby, or someone else. Call your doctor now or seek immediate medical care if: 
· You are having trouble caring for yourself or your baby. · You hear voices. Watch closely for changes in your health, and be sure to contact your doctor if: 
· You have problems with your depression medicine. · You do not get better as expected. Where can you learn more? Go to http://luc[employer]acter[employer] International Holdingscaleb.info/. Enter M874 in the search box to learn more about \"Depression After Childbirth: Care Instructions. \" Current as of: July 26, 2016 Content Version: 11.1 © 2684-7188 [employer]. Care instructions adapted under license by [employer] (which disclaims liability or warranty for this information). If you have questions about a medical condition or this instruction, always ask your healthcare professional. [place] [address] any warranty or liability for your use of this information. Breastfeeding: Care Instructions Your Care Instructions Breastfeeding has many benefits. It may lower your baby's chances of getting an infection. It also may prevent your baby from having problems such as diabetes and high cholesterol later in life. Breastfeeding also helps you bond with your baby. The American Academy of Pediatrics recommends breastfeeding for at least a year. That may be very hard for many women to do, but breastfeeding even for a shorter period of time is a health benefit to you and your baby. In the first days after birth, your breasts make a thick, yellow liquid called colostrum. This liquid gives your baby nutrients and antibodies against infection. It is all that babies need in the first days after birth. Your breasts will fill with milk a few days after the birth. Breastfeeding is a skill that gets better with practice. It is common to have some problems. Some women have sore or cracked nipples, blocked milk ducts, or a breast infection (mastitis). But if you feed your baby every 1 to 2 hours during the day and follow the tips on this sheet, you may not have these problems. You can treat these problems if they happen and continue breastfeeding. Follow-up care is a key part of your treatment and safety. Be sure to make and go to all appointments, and call your doctor if you are having problems. It's also a good idea to know your test results and keep a list of the medicines you take. How can you care for yourself at home? · Breastfeed your baby whenever he or she is hungry. In the first 2 weeks, your baby will feed about every 1 to 3 hours. This will help you keep up your supply of milk. · Put a bed pillow or a nursing pillow on your lap to support your arms and your baby. · Hold your baby in a comfortable position. ¨ You can hold your baby in several ways. One of the most common positions is the cradle hold. One arm supports your baby, with his or her head in the bend of your elbow. Your open hand supports your baby's bottom or back. Your baby's belly lies against yours. ¨ If you had your baby by , or , try the football hold. This position keeps your baby off your belly. Tuck your baby under your arm, with his or her body along the side you will be feeding on. Support your baby's upper body with your arm. With that hand you can control your baby's head to bring his or her mouth to your breast. 
¨ Try different positions with each feeding. If you are having problems, ask for help from your doctor or a lactation consultant. · To get your baby to latch on: 
¨ Support and narrow your breast with one hand using a \"U hold,\" with your thumb on the outer side of your breast and your fingers on the inner side. You can also use a \"C hold,\" with all your fingers below the nipple and your thumb above it. Try the different holds to get the deepest latch for whichever breastfeeding position you use. Your other arm is behind your baby's back, with your hand supporting the base of the baby's head. Position your fingers and thumb to point toward your baby's ears. ¨ You can touch your baby's lower lip with your nipple to get your baby to open his or her mouth. Wait until your baby opens up really wide, like a big yawn. Then be sure to bring the baby quickly to your breastnot your breast to the baby. As you bring your baby toward your breast, use your other hand to support the breast and guide it into his or her mouth. ¨ Both the nipple and a large portion of the darker area around the nipple (areola) should be in the baby's mouth. The baby's lips should be flared outward, not folded in (inverted). ¨ Listen for a regular sucking and swallowing pattern while the baby is feeding. If you cannot see or hear a swallowing pattern, watch the baby's ears, which will wiggle slightly when the baby swallows. If the baby's nose appears to be blocked by your breast, tilt the baby's head back slightly, so just the edge of one nostril is clear for breathing. ¨ When your baby is latched, you can usually remove your hand from supporting your breast and bring it under your baby to cradle him or her. Now just relax and breastfeed your baby. · You will know that your baby is feeding well when: 
¨ His or her mouth covers a lot of the areola, and the lips are flared out. ¨ His or her chin and nose rest against your breast. 
¨ Sucking is deep and rhythmic, with short pauses. ¨ You are able to see and hear your baby swallowing. ¨ You do not feel pain in your nipple.  
· If your baby takes only one breast at a feeding, start the next feeding on the other breast. 
· Anytime you need to remove your baby from the breast, put one finger in the corner of his or her mouth. Push your finger between your baby's gums to gently break the seal. If you do not break the tight seal before you remove your baby, your nipples can become sore, cracked, or bruised. · After feeding your baby, gently pat his or her back to let out any swallowed air. After your baby burps, offer the breast again, or offer the other breast. Sometimes a baby will want to keep feeding after being burped. When should you call for help? Call your doctor now or seek immediate medical care if: 
· You have symptoms of a breast infection, such as: 
¨ Increased pain, swelling, redness, or warmth around a breast. 
¨ Red streaks extending from the breast. 
¨ Pus draining from a breast. 
¨ A fever. · Your baby has no wet diapers for 6 hours. Watch closely for changes in your health, and be sure to contact your doctor if: 
· Your baby has trouble latching on to your breast. 
· You continue to have pain or discomfort when breastfeeding. · You have other questions or concerns. Where can you learn more? Go to http://lucius-caleb.info/. Enter P492 in the search box to learn more about \"Breastfeeding: Care Instructions. \" Current as of: May 30, 2016 Content Version: 11.1 © 9484-7591 [employer]. Care instructions adapted under license by [employer] (which disclaims liability or warranty for this information). If you have questions about a medical condition or this instruction, always ask your healthcare professional. [place] [address] any warranty or liability for your use of this information.

## 2017-03-01 NOTE — PROGRESS NOTES
2469 pt arrived in L&D via wheelchair w/ c/o regular ucs since 12mn    Denies rom, but reports bloody mucous discharge. 0231 to bed, efm initiated w/ pt on left tilt   fhr 125  0258 returned to left lateral, us and toco relocated  0307 called sherie Toney cnm---informed her of this  previous c/s del for failure to progress here w/ c/o reg ucs. pt cervix closed this previous day when examined by dr Verito Yuan (pt states) and was given [place] for rest & told by dr Verito Yuan (pt states) if [place] was not effective in helping her rest--that pt could come to hospital and be given medication for pain & rest.  Cervix exam after arrival to L&D--fingertip, ucs q4min, moderate. Orders rec'd for ivlr and dilaudid and phenergan iv  0408  efm off--pt up to br  5329 returned to bed, efm resumed w/ pt on left lateral    Us and toco relocated  0416  fhr 61 12 61 efm off--assisted up to br  4955 assisted back to bed, efm resumed w/ pt on left lateral   Us and toco relocated  0542   fhr 125  0715 sherie chatman cnm called in for report on pt status---informed her pt was able to sleep some and ucs have decreased in frequency. sherie chatman cnm orders that pt may be discharged to home, undelivered, if pt comfortable w/ this POC.  3497 report given to lyn paris rn and  care of pt assumed by her at this time.

## 2017-03-01 NOTE — IP AVS SNAPSHOT
[address] Patient: Mahesh Brizuela MRN: [id number] DYS:9[birth year]30 You are allergic to the following Allergen Reactions Sulfa (Sulfonamide Antibiotics) Hives Recent Documentation Weight 89.8 kg Emergency Contacts Name Discharge Info Relation Home Work Mobile Sky Leone DISCHARGE CAREGIVER [3] Boyfriend [17] [phone] Denver Mikes YES [1]  [phone] About your hospitalization You were admitted on:  N/A You last received care in the:  [address] You were discharged on:  March 1, 2017 Unit phone number:  [phone] Why you were hospitalized Your primary diagnosis was:  Not on File Providers Seen During Your Hospitalizations Provider Role Specialty Primary office phone Nicholas Giraldo MD Attending Provider Obstetrics & Gynecology [phone] Your Primary Care Physician (PCP) Primary Care Physician Office Phone Office Fax Elsy Pineda [phone] Follow-up Information Follow up With Details Comments Contact Info Elena Leach MD   [address] [facility] [address] [place] [address] 
[phone] Current Discharge Medication List  
  
ASK your doctor about these medications Dose & Instructions Dispensing Information Comments Morning Noon Evening Bedtime COLACE 100 mg capsule Generic drug:  docusate sodium Your next dose is: Today, Tomorrow Other:  _________ Dose:  100 mg Take 100 mg by mouth two (2) times daily as needed for Constipation. Refills:  0 Iron 325 mg (65 mg iron) tablet Generic drug:  ferrous sulfate Your next dose is: Today, Tomorrow Other:  _________  Dose:  325 mg  
 Take 325 mg by mouth Daily (before breakfast). Refills:  0  
     
   
   
   
  
 oxyCODONE-acetaminophen 5-325 mg per tablet Commonly known as:  PERCOCET Your next dose is: Today, Tomorrow Other:  _________ Dose:  2 Tab Take 2 Tabs by mouth every four (4) hours as needed. Max Daily Amount: 12 Tabs. Quantity:  30 Tab Refills:  0  
     
   
   
   
  
 prenatal multivit-ca-min-fe-fa Tab Your next dose is: Today, Tomorrow Other:  _________ Dose:  1 Tab Take 1 Tab by mouth daily. Refills:  0  
     
   
   
   
  
 VITAMIN D3 1,000 unit Cap Generic drug:  cholecalciferol Your next dose is: Today, Tomorrow Other:  _________ Dose:  1000 Units Take 1,000 Units by mouth daily. Refills:  0 Discharge Instructions None Discharge Instructions Attachments/References PREGNANCY: PRECAUTIONS (ENGLISH) Discharge Orders None Introducing [facility]! [facility] introduces [employer] patient portal. Now you can access parts of your medical record, email your doctor's office, and request medication refills online. 1. In your internet browser, go to [email or web address] 2. Click on the First Time User? Click Here link in the Sign In box. You will see the New Member Sign Up page. 3. Enter your [employer] Access Code exactly as it appears below. You will not need to use this code after youve completed the sign-up process. If you do not sign up before the expiration date, you must request a new code. · [employer] Access Code: [facility] Expires: 4/13/2017  4:07 PM 
 
4. Enter the last four digits of your Social Security Number (xxxx) and Date of Birth (mm/dd/yyyy) as indicated and click Submit. You will be taken to the next sign-up page. 5. Create a [employer] ID.  This will be your [employer] login ID and cannot be changed, so think of one that is secure and easy to remember. 6. Create a [employer] password. You can change your password at any time. 7. Enter your Password Reset Question and Answer. This can be used at a later time if you forget your password. 8. Enter your e-mail address. You will receive e-mail notification when new information is available in [address] 9. Click Sign Up. You can now view and download portions of your medical record. 10. Click the Download Summary menu link to download a portable copy of your medical information. If you have questions, please visit the Frequently Asked Questions section of the [employer] website. Remember, [employer] is NOT to be used for urgent needs. For medical emergencies, dial 911. Now available from your iPhone and Android! General Information Please provide this summary of care documentation to your next provider. Patient Signature:  ____________________________________________________________ Date:  ____________________________________________________________  
  
[facility] Provider Signature:  ____________________________________________________________ Date:  ____________________________________________________________ More Information Keep scheduled office appointment. Rest as needed- 
Snack on protein and healthy carbs (even if not hungry) Stay well hydrated- urine should be clear to very pale yellow. Try warm bath or shower, massage (foot or shoulder), distraction (TV, movie, journaling),  Position changes (hands & knees, side-lying, sitting up tall). Tylenol &/or Benadryl   (Tylenol PM) per label instructions. Time contractions start to start--  When less than 5 min apart for 1-2 hours call midwife (plan to come for labor check). Try counting when pain is bothersome-- by the time you get to 20 you may feel better. Page midwife with concerns or questions. Pregnancy Precautions: Care Instructions Your Care Instructions There is no sure way to prevent labor before your due date ( labor) or to prevent most other pregnancy problems. But there are things you can do to increase your chances of a healthy pregnancy. Go to your appointments, follow your doctor's advice, and take good care of yourself. Eat well, and exercise (if your doctor agrees). And make sure to drink plenty of water. Follow-up care is a key part of your treatment and safety. Be sure to make and go to all appointments, and call your doctor if you are having problems. It's also a good idea to know your test results and keep a list of the medicines you take. How can you care for yourself at home? · Make sure you go to your prenatal appointments. At each visit, your doctor will check your blood pressure. Your doctor will also check to see if you have protein in your urine. High blood pressure and protein in urine are signs of preeclampsia. This condition can be dangerous for you and your baby. · Drink plenty of fluids, enough so that your urine is light yellow or clear like water. Dehydration can cause contractions. If you have kidney, heart, or liver disease and have to limit fluids, talk with your doctor before you increase the amount of fluids you drink. · Tell your doctor right away if you notice any symptoms of an infection, such as: ¨ Burning when you urinate. ¨ A foul-smelling discharge from your vagina. ¨ Vaginal itching. ¨ Unexplained fever. ¨ Unusual pain or soreness in your uterus or lower belly. · Eat a balanced diet. Include plenty of foods that are high in calcium and iron. ¨ Foods high in calcium include milk, cheese, yogurt, almonds, and broccoli. ¨ Foods high in iron include red meat, shellfish, poultry, eggs, beans, raisins, whole-grain bread, and leafy green vegetables. · Do not smoke.  If you need help quitting, talk to your doctor about stop-smoking programs and medicines. These can increase your chances of quitting for good. · Do not drink alcohol or use illegal drugs. · Follow your doctor's directions about activity. Your doctor will let you know how much, if any, exercise you can do. · Ask your doctor if you can have sex. If you are at risk for early labor, your doctor may ask you to not have sex. · Take care to prevent falls. During pregnancy, your joints are loose, and your balance is off. Sports such as bicycling, skiing, or in-line skating can increase your risk of falling. And don't ride horses or motorcycles, dive, water ski, scuba dive, or parachute jump while you are pregnant. · Avoid getting very hot. Do not use saunas or hot tubs. Avoid staying out in the sun in hot weather for long periods. Take acetaminophen (Tylenol) to lower a high fever. · Do not take any over-the-counter or herbal medicines or supplements without talking to your doctor or pharmacist first. 
When should you call for help? Call 911 anytime you think you may need emergency care. For example, call if: 
· You passed out (lost consciousness). · You have severe vaginal bleeding. · You have severe pain in your belly or pelvis. · You have had fluid gushing or leaking from your vagina and you know or think the umbilical cord is bulging into your vagina. If this happens, immediately get down on your knees so your rear end (buttocks) is higher than your head. This will decrease the pressure on the cord until help arrives. Call your doctor now or seek immediate medical care if: 
· You have signs of preeclampsia, such as: 
¨ Sudden swelling of your face, hands, or feet. ¨ New vision problems (such as dimness or blurring). ¨ A severe headache. · You have any vaginal bleeding. · You have belly pain or cramping. · You have a fever. · You have had regular contractions (with or without pain) for an hour. This means that you have 8 or more within 1 hour or 4 or more in 20 minutes after you change your position and drink fluids. · You have a sudden release of fluid from your vagina. · You have low back pain or pelvic pressure that does not go away. · You notice that your baby has stopped moving or is moving much less than normal. 
Watch closely for changes in your health, and be sure to contact your doctor if you have any problems. Where can you learn more? Go to http://lucius-caleb.info/. Enter [id number] in the search box to learn more about \"Pregnancy Precautions: Care Instructions. \" Current as of: May 30, 2016 Content Version: 11.1 © 5866-5962 [employer], [employer]. Care instructions adapted under license by [employer] (which disclaims liability or warranty for this information). If you have questions about a medical condition or this instruction, always ask your healthcare professional. [address] any warranty or liability for your use of this information.

## 2017-03-01 NOTE — ROUTINE PROCESS
Bedside and Verbal shift change report given to Elvia Marroquin (oncoming nurse) by Companion Canine RN (offgoing nurse). Report included the following information SBAR, Procedure Summary, Intake/Output, MAR and Recent Results.

## 2017-03-01 NOTE — PROGRESS NOTES
~~ 0720 ASSUME CARE OF PT DOZING IN BED, STIRRING W/ CTX-- SITTING @ BS DISCUSSING POC TO GO HOME. PT WANTS TO GO HOME- PT SAYS SHE CBE BUT \"DOESN'T REMEMBER ANYTHING. \"  REVIEWED COMFORT MEASURES FOR HOME, EATING & HYDRATION, MEDS, TIMING CTX & BREATHING PATTERNS. PT GROGGY BUT RECEPTIVE TO ALL. SR UP X2, CB IN REACH, FOB SLEEPING ON COUCH.    ~~0726 EFM OFF W/ REACTIVE TRACING    ~~0728 IV REMOVED. PT STILL SLEEPY FROM THERAP REST--  WILL NAP UNTIL I RETURN W/ D/C PAPERS    ~~0750 PT DOZING. PT SAYS SHE STILL FEELS MEDS AND WOULD LIKE TO REST A BIT LONGER--  WRITTEN D/C INSTRUCTIONS REVIEWED & GIVEN TO PT. SINCE UNIT CENSUS ALLOWS WILL LET PT REST IN BED A BIT LONGER.  PT TOLD TO CALL WHEN READY FOR W/C--

## 2017-03-02 ENCOUNTER — ANESTHESIA (OUTPATIENT)
Dept: LABOR AND DELIVERY | Age: 37
End: 2017-03-02
Payer: COMMERCIAL

## 2017-03-02 ENCOUNTER — ANESTHESIA EVENT (OUTPATIENT)
Dept: LABOR AND DELIVERY | Age: 37
End: 2017-03-02
Payer: COMMERCIAL

## 2017-03-02 LAB
ABO + RH BLD: NORMAL
BASOPHILS # BLD AUTO: 0 K/UL (ref 0–0.06)
BASOPHILS # BLD: 0 % (ref 0–2)
BLOOD GROUP ANTIBODIES SERPL: NORMAL
DIFFERENTIAL METHOD BLD: ABNORMAL
EOSINOPHIL # BLD: 0.1 K/UL (ref 0–0.4)
EOSINOPHIL NFR BLD: 1 % (ref 0–5)
ERYTHROCYTE [DISTWIDTH] IN BLOOD BY AUTOMATED COUNT: 16.5 % (ref 11.6–14.5)
HCT VFR BLD AUTO: 33.5 % (ref 35–45)
HGB BLD-MCNC: 11.2 G/DL (ref 12–16)
LYMPHOCYTES # BLD AUTO: 15 % (ref 21–52)
LYMPHOCYTES # BLD: 1.5 K/UL (ref 0.9–3.6)
MCH RBC QN AUTO: 27.5 PG (ref 24–34)
MCHC RBC AUTO-ENTMCNC: 33.4 G/DL (ref 31–37)
MCV RBC AUTO: 82.1 FL (ref 74–97)
MONOCYTES # BLD: 1 K/UL (ref 0.05–1.2)
MONOCYTES NFR BLD AUTO: 10 % (ref 3–10)
NEUTS SEG # BLD: 7.6 K/UL (ref 1.8–8)
NEUTS SEG NFR BLD AUTO: 74 % (ref 40–73)
PLATELET # BLD AUTO: 331 K/UL (ref 135–420)
PMV BLD AUTO: 10.5 FL (ref 9.2–11.8)
RBC # BLD AUTO: 4.08 M/UL (ref 4.2–5.3)
SPECIMEN EXP DATE BLD: NORMAL
WBC # BLD AUTO: 10.1 K/UL (ref 4.6–13.2)

## 2017-03-02 PROCEDURE — 74011250636 HC RX REV CODE- 250/636: Performed by: NURSE ANESTHETIST, CERTIFIED REGISTERED

## 2017-03-02 PROCEDURE — 77030036554

## 2017-03-02 PROCEDURE — 75410000002 HC LABOR FEE PER 1 HR

## 2017-03-02 PROCEDURE — 77030002974 HC SUT PLN J&J -A: Performed by: OBSTETRICS & GYNECOLOGY

## 2017-03-02 PROCEDURE — 74011250636 HC RX REV CODE- 250/636

## 2017-03-02 PROCEDURE — 74011250636 HC RX REV CODE- 250/636: Performed by: ADVANCED PRACTICE MIDWIFE

## 2017-03-02 PROCEDURE — 76010000392 HC C SECN EA ADDL 0.5 HR: Performed by: OBSTETRICS & GYNECOLOGY

## 2017-03-02 PROCEDURE — 74011000250 HC RX REV CODE- 250

## 2017-03-02 PROCEDURE — 77030002935 HC SUT MCRYL J&J -C: Performed by: OBSTETRICS & GYNECOLOGY

## 2017-03-02 PROCEDURE — 76010000391 HC C SECN FIRST 1 HR: Performed by: OBSTETRICS & GYNECOLOGY

## 2017-03-02 PROCEDURE — 0UN90ZZ RELEASE UTERUS, OPEN APPROACH: ICD-10-PCS | Performed by: OBSTETRICS & GYNECOLOGY

## 2017-03-02 PROCEDURE — 76060000078 HC EPIDURAL ANESTHESIA

## 2017-03-02 PROCEDURE — 74011250636 HC RX REV CODE- 250/636: Performed by: OBSTETRICS & GYNECOLOGY

## 2017-03-02 PROCEDURE — 77030007866 HC KT SPN ANES BBMI -B: Performed by: ANESTHESIOLOGY

## 2017-03-02 PROCEDURE — 77030020255 HC SOL INJ LR 1000ML BG

## 2017-03-02 PROCEDURE — 77030031139 HC SUT VCRL2 J&J -A: Performed by: OBSTETRICS & GYNECOLOGY

## 2017-03-02 PROCEDURE — 77030032490 HC SLV COMPR SCD KNE COVD -B: Performed by: OBSTETRICS & GYNECOLOGY

## 2017-03-02 PROCEDURE — 75410000003 HC RECOV DEL/VAG/CSECN EA 0.5 HR

## 2017-03-02 PROCEDURE — 76060000078 HC EPIDURAL ANESTHESIA: Performed by: OBSTETRICS & GYNECOLOGY

## 2017-03-02 PROCEDURE — 74011250637 HC RX REV CODE- 250/637

## 2017-03-02 PROCEDURE — 75410000003 HC RECOV DEL/VAG/CSECN EA 0.5 HR: Performed by: OBSTETRICS & GYNECOLOGY

## 2017-03-02 PROCEDURE — 65270000029 HC RM PRIVATE

## 2017-03-02 RX ORDER — SODIUM CHLORIDE, SODIUM LACTATE, POTASSIUM CHLORIDE, CALCIUM CHLORIDE 600; 310; 30; 20 MG/100ML; MG/100ML; MG/100ML; MG/100ML
100 INJECTION, SOLUTION INTRAVENOUS CONTINUOUS
Status: DISPENSED | OUTPATIENT
Start: 2017-03-02 | End: 2017-03-02

## 2017-03-02 RX ORDER — KETOROLAC TROMETHAMINE 30 MG/ML
30 INJECTION, SOLUTION INTRAMUSCULAR; INTRAVENOUS EVERY 6 HOURS
Status: CANCELLED | OUTPATIENT
Start: 2017-03-02 | End: 2017-03-03

## 2017-03-02 RX ORDER — PROMETHAZINE HYDROCHLORIDE 25 MG/ML
25 INJECTION, SOLUTION INTRAMUSCULAR; INTRAVENOUS
Status: DISCONTINUED | OUTPATIENT
Start: 2017-03-02 | End: 2017-03-04 | Stop reason: HOSPADM

## 2017-03-02 RX ORDER — HYDROMORPHONE HYDROCHLORIDE 1 MG/ML
0.5 INJECTION, SOLUTION INTRAMUSCULAR; INTRAVENOUS; SUBCUTANEOUS
Status: DISCONTINUED | OUTPATIENT
Start: 2017-03-02 | End: 2017-03-04 | Stop reason: HOSPADM

## 2017-03-02 RX ORDER — OXYCODONE AND ACETAMINOPHEN 5; 325 MG/1; MG/1
1-2 TABLET ORAL
Status: DISCONTINUED | OUTPATIENT
Start: 2017-03-02 | End: 2017-03-04 | Stop reason: HOSPADM

## 2017-03-02 RX ORDER — SODIUM CHLORIDE, SODIUM LACTATE, POTASSIUM CHLORIDE, CALCIUM CHLORIDE 600; 310; 30; 20 MG/100ML; MG/100ML; MG/100ML; MG/100ML
125 INJECTION, SOLUTION INTRAVENOUS CONTINUOUS
Status: DISPENSED | OUTPATIENT
Start: 2017-03-02 | End: 2017-03-03

## 2017-03-02 RX ORDER — SODIUM CHLORIDE 0.9 % (FLUSH) 0.9 %
5-10 SYRINGE (ML) INJECTION EVERY 8 HOURS
Status: DISCONTINUED | OUTPATIENT
Start: 2017-03-02 | End: 2017-03-02 | Stop reason: SDUPTHER

## 2017-03-02 RX ORDER — ACETAMINOPHEN 325 MG/1
650 TABLET ORAL
Status: DISCONTINUED | OUTPATIENT
Start: 2017-03-02 | End: 2017-03-02 | Stop reason: SDUPTHER

## 2017-03-02 RX ORDER — SODIUM CHLORIDE, SODIUM LACTATE, POTASSIUM CHLORIDE, CALCIUM CHLORIDE 600; 310; 30; 20 MG/100ML; MG/100ML; MG/100ML; MG/100ML
150 INJECTION, SOLUTION INTRAVENOUS CONTINUOUS
Status: DISCONTINUED | OUTPATIENT
Start: 2017-03-02 | End: 2017-03-02 | Stop reason: HOSPADM

## 2017-03-02 RX ORDER — DIPHENHYDRAMINE HYDROCHLORIDE 50 MG/ML
25 INJECTION, SOLUTION INTRAMUSCULAR; INTRAVENOUS
Status: DISCONTINUED | OUTPATIENT
Start: 2017-03-02 | End: 2017-03-04 | Stop reason: HOSPADM

## 2017-03-02 RX ORDER — SODIUM CHLORIDE 0.9 % (FLUSH) 0.9 %
5-10 SYRINGE (ML) INJECTION EVERY 8 HOURS
Status: DISCONTINUED | OUTPATIENT
Start: 2017-03-02 | End: 2017-03-03

## 2017-03-02 RX ORDER — KETOROLAC TROMETHAMINE 30 MG/ML
30 INJECTION, SOLUTION INTRAMUSCULAR; INTRAVENOUS
Status: DISCONTINUED | OUTPATIENT
Start: 2017-03-02 | End: 2017-03-02 | Stop reason: SDUPTHER

## 2017-03-02 RX ORDER — SIMETHICONE 80 MG
80 TABLET,CHEWABLE ORAL
Status: DISCONTINUED | OUTPATIENT
Start: 2017-03-02 | End: 2017-03-02 | Stop reason: SDUPTHER

## 2017-03-02 RX ORDER — SIMETHICONE 80 MG
80 TABLET,CHEWABLE ORAL
Status: DISCONTINUED | OUTPATIENT
Start: 2017-03-02 | End: 2017-03-04 | Stop reason: HOSPADM

## 2017-03-02 RX ORDER — ONDANSETRON 2 MG/ML
4 INJECTION INTRAMUSCULAR; INTRAVENOUS
Status: DISCONTINUED | OUTPATIENT
Start: 2017-03-02 | End: 2017-03-04 | Stop reason: HOSPADM

## 2017-03-02 RX ORDER — SODIUM CHLORIDE 0.9 % (FLUSH) 0.9 %
5-10 SYRINGE (ML) INJECTION AS NEEDED
Status: DISCONTINUED | OUTPATIENT
Start: 2017-03-02 | End: 2017-03-04 | Stop reason: HOSPADM

## 2017-03-02 RX ORDER — PROMETHAZINE HYDROCHLORIDE 25 MG/ML
25 INJECTION, SOLUTION INTRAMUSCULAR; INTRAVENOUS
Status: DISCONTINUED | OUTPATIENT
Start: 2017-03-02 | End: 2017-03-02 | Stop reason: SDUPTHER

## 2017-03-02 RX ORDER — TRISODIUM CITRATE DIHYDRATE AND CITRIC ACID MONOHYDRATE 500; 334 MG/5ML; MG/5ML
SOLUTION ORAL
Status: COMPLETED
Start: 2017-03-02 | End: 2017-03-02

## 2017-03-02 RX ORDER — OXYTOCIN/RINGER'S LACTATE 20/1000 ML
125 PLASTIC BAG, INJECTION (ML) INTRAVENOUS CONTINUOUS
Status: DISCONTINUED | OUTPATIENT
Start: 2017-03-02 | End: 2017-03-04 | Stop reason: HOSPADM

## 2017-03-02 RX ORDER — NALBUPHINE HYDROCHLORIDE 10 MG/ML
5 INJECTION, SOLUTION INTRAMUSCULAR; INTRAVENOUS; SUBCUTANEOUS
Status: ACTIVE | OUTPATIENT
Start: 2017-03-02 | End: 2017-03-03

## 2017-03-02 RX ORDER — NALOXONE HYDROCHLORIDE 0.4 MG/ML
0.4 INJECTION, SOLUTION INTRAMUSCULAR; INTRAVENOUS; SUBCUTANEOUS
Status: DISCONTINUED | OUTPATIENT
Start: 2017-03-02 | End: 2017-03-04 | Stop reason: HOSPADM

## 2017-03-02 RX ORDER — IBUPROFEN 400 MG/1
800 TABLET ORAL
Status: DISCONTINUED | OUTPATIENT
Start: 2017-03-05 | End: 2017-03-02 | Stop reason: SDUPTHER

## 2017-03-02 RX ORDER — IBUPROFEN 400 MG/1
800 TABLET ORAL
Status: DISCONTINUED | OUTPATIENT
Start: 2017-03-05 | End: 2017-03-03

## 2017-03-02 RX ORDER — ZOLPIDEM TARTRATE 5 MG/1
5 TABLET ORAL
Status: DISCONTINUED | OUTPATIENT
Start: 2017-03-02 | End: 2017-03-04 | Stop reason: HOSPADM

## 2017-03-02 RX ORDER — EPHEDRINE SULFATE/0.9% NACL/PF 25 MG/5 ML
SYRINGE (ML) INTRAVENOUS AS NEEDED
Status: DISCONTINUED | OUTPATIENT
Start: 2017-03-02 | End: 2017-03-02 | Stop reason: HOSPADM

## 2017-03-02 RX ORDER — SODIUM CHLORIDE, SODIUM LACTATE, POTASSIUM CHLORIDE, CALCIUM CHLORIDE 600; 310; 30; 20 MG/100ML; MG/100ML; MG/100ML; MG/100ML
125 INJECTION, SOLUTION INTRAVENOUS CONTINUOUS
Status: CANCELLED | OUTPATIENT
Start: 2017-03-02 | End: 2017-03-03

## 2017-03-02 RX ORDER — DEXAMETHASONE SODIUM PHOSPHATE 4 MG/ML
INJECTION, SOLUTION INTRA-ARTICULAR; INTRALESIONAL; INTRAMUSCULAR; INTRAVENOUS; SOFT TISSUE AS NEEDED
Status: DISCONTINUED | OUTPATIENT
Start: 2017-03-02 | End: 2017-03-02 | Stop reason: HOSPADM

## 2017-03-02 RX ORDER — FACIAL-BODY WIPES
10 EACH TOPICAL
Status: DISCONTINUED | OUTPATIENT
Start: 2017-03-02 | End: 2017-03-04 | Stop reason: HOSPADM

## 2017-03-02 RX ORDER — OXYTOCIN/RINGER'S LACTATE 20/1000 ML
125 PLASTIC BAG, INJECTION (ML) INTRAVENOUS CONTINUOUS
Status: CANCELLED | OUTPATIENT
Start: 2017-03-02

## 2017-03-02 RX ORDER — ACETAMINOPHEN 325 MG/1
650 TABLET ORAL
Status: DISCONTINUED | OUTPATIENT
Start: 2017-03-02 | End: 2017-03-04 | Stop reason: HOSPADM

## 2017-03-02 RX ORDER — OXYCODONE AND ACETAMINOPHEN 5; 325 MG/1; MG/1
1-2 TABLET ORAL
Status: CANCELLED | OUTPATIENT
Start: 2017-03-02

## 2017-03-02 RX ORDER — BUPIVACAINE HYDROCHLORIDE 7.5 MG/ML
INJECTION, SOLUTION INTRASPINAL AS NEEDED
Status: DISCONTINUED | OUTPATIENT
Start: 2017-03-02 | End: 2017-03-02 | Stop reason: HOSPADM

## 2017-03-02 RX ORDER — ONDANSETRON 2 MG/ML
INJECTION INTRAMUSCULAR; INTRAVENOUS AS NEEDED
Status: DISCONTINUED | OUTPATIENT
Start: 2017-03-02 | End: 2017-03-02 | Stop reason: HOSPADM

## 2017-03-02 RX ORDER — CEFAZOLIN SODIUM 2 G/50ML
2 SOLUTION INTRAVENOUS ONCE
Status: COMPLETED | OUTPATIENT
Start: 2017-03-02 | End: 2017-03-02

## 2017-03-02 RX ORDER — MORPHINE SULFATE 1 MG/ML
INJECTION, SOLUTION EPIDURAL; INTRATHECAL; INTRAVENOUS AS NEEDED
Status: DISCONTINUED | OUTPATIENT
Start: 2017-03-02 | End: 2017-03-02 | Stop reason: HOSPADM

## 2017-03-02 RX ORDER — SODIUM CHLORIDE 0.9 % (FLUSH) 0.9 %
5-10 SYRINGE (ML) INJECTION AS NEEDED
Status: DISCONTINUED | OUTPATIENT
Start: 2017-03-02 | End: 2017-03-02 | Stop reason: SDUPTHER

## 2017-03-02 RX ORDER — DIPHENHYDRAMINE HYDROCHLORIDE 50 MG/ML
25 INJECTION, SOLUTION INTRAMUSCULAR; INTRAVENOUS
Status: DISCONTINUED | OUTPATIENT
Start: 2017-03-02 | End: 2017-03-02 | Stop reason: SDUPTHER

## 2017-03-02 RX ORDER — KETOROLAC TROMETHAMINE 30 MG/ML
30 INJECTION, SOLUTION INTRAMUSCULAR; INTRAVENOUS EVERY 6 HOURS
Status: DISCONTINUED | OUTPATIENT
Start: 2017-03-02 | End: 2017-03-02 | Stop reason: SDUPTHER

## 2017-03-02 RX ORDER — CEFAZOLIN SODIUM 2 G/50ML
SOLUTION INTRAVENOUS
Status: COMPLETED
Start: 2017-03-02 | End: 2017-03-02

## 2017-03-02 RX ORDER — FACIAL-BODY WIPES
10 EACH TOPICAL
Status: DISCONTINUED | OUTPATIENT
Start: 2017-03-02 | End: 2017-03-02 | Stop reason: SDUPTHER

## 2017-03-02 RX ORDER — KETOROLAC TROMETHAMINE 30 MG/ML
INJECTION, SOLUTION INTRAMUSCULAR; INTRAVENOUS AS NEEDED
Status: DISCONTINUED | OUTPATIENT
Start: 2017-03-02 | End: 2017-03-02

## 2017-03-02 RX ORDER — OXYTOCIN 10 [USP'U]/ML
INJECTION, SOLUTION INTRAMUSCULAR; INTRAVENOUS AS NEEDED
Status: DISCONTINUED | OUTPATIENT
Start: 2017-03-02 | End: 2017-03-02 | Stop reason: HOSPADM

## 2017-03-02 RX ORDER — ZOLPIDEM TARTRATE 5 MG/1
5 TABLET ORAL
Status: DISCONTINUED | OUTPATIENT
Start: 2017-03-02 | End: 2017-03-02 | Stop reason: SDUPTHER

## 2017-03-02 RX ORDER — MORPHINE SULFATE 2 MG/ML
2 INJECTION, SOLUTION INTRAMUSCULAR; INTRAVENOUS
Status: DISCONTINUED | OUTPATIENT
Start: 2017-03-02 | End: 2017-03-04 | Stop reason: HOSPADM

## 2017-03-02 RX ORDER — FENTANYL CITRATE 50 UG/ML
INJECTION, SOLUTION INTRAMUSCULAR; INTRAVENOUS AS NEEDED
Status: DISCONTINUED | OUTPATIENT
Start: 2017-03-02 | End: 2017-03-02 | Stop reason: HOSPADM

## 2017-03-02 RX ADMIN — HYDROMORPHONE HYDROCHLORIDE 0.5 MG: 1 INJECTION, SOLUTION INTRAMUSCULAR; INTRAVENOUS; SUBCUTANEOUS at 18:05

## 2017-03-02 RX ADMIN — HYDROMORPHONE HYDROCHLORIDE 0.5 MG: 1 INJECTION, SOLUTION INTRAMUSCULAR; INTRAVENOUS; SUBCUTANEOUS at 15:06

## 2017-03-02 RX ADMIN — HYDROMORPHONE HYDROCHLORIDE 0.5 MG: 1 INJECTION, SOLUTION INTRAMUSCULAR; INTRAVENOUS; SUBCUTANEOUS at 08:24

## 2017-03-02 RX ADMIN — OXYTOCIN 20 UNITS: 10 INJECTION, SOLUTION INTRAMUSCULAR; INTRAVENOUS at 02:22

## 2017-03-02 RX ADMIN — SODIUM CITRATE AND CITRIC ACID MONOHYDRATE: 500; 334 SOLUTION ORAL at 01:01

## 2017-03-02 RX ADMIN — SODIUM CHLORIDE, SODIUM LACTATE, POTASSIUM CHLORIDE, AND CALCIUM CHLORIDE 125 ML/HR: 600; 310; 30; 20 INJECTION, SOLUTION INTRAVENOUS at 05:14

## 2017-03-02 RX ADMIN — CEFAZOLIN SODIUM: 2 SOLUTION INTRAVENOUS at 01:05

## 2017-03-02 RX ADMIN — HYDROMORPHONE HYDROCHLORIDE 0.5 MG: 1 INJECTION, SOLUTION INTRAMUSCULAR; INTRAVENOUS; SUBCUTANEOUS at 22:33

## 2017-03-02 RX ADMIN — SODIUM CHLORIDE, SODIUM LACTATE, POTASSIUM CHLORIDE, AND CALCIUM CHLORIDE 100 ML/HR: 600; 310; 30; 20 INJECTION, SOLUTION INTRAVENOUS at 02:00

## 2017-03-02 RX ADMIN — SODIUM CHLORIDE, SODIUM LACTATE, POTASSIUM CHLORIDE, AND CALCIUM CHLORIDE 125 ML/HR: 600; 310; 30; 20 INJECTION, SOLUTION INTRAVENOUS at 12:04

## 2017-03-02 RX ADMIN — MORPHINE SULFATE 0.2 MG: 1 INJECTION, SOLUTION EPIDURAL; INTRATHECAL; INTRAVENOUS at 01:14

## 2017-03-02 RX ADMIN — Medication 10 MG: at 02:12

## 2017-03-02 RX ADMIN — HYDROMORPHONE HYDROCHLORIDE 0.5 MG: 1 INJECTION, SOLUTION INTRAMUSCULAR; INTRAVENOUS; SUBCUTANEOUS at 11:22

## 2017-03-02 RX ADMIN — BUPIVACAINE HYDROCHLORIDE 1.3 ML: 7.5 INJECTION, SOLUTION INTRASPINAL at 01:14

## 2017-03-02 RX ADMIN — FENTANYL CITRATE 20 MCG: 50 INJECTION, SOLUTION INTRAMUSCULAR; INTRAVENOUS at 01:14

## 2017-03-02 RX ADMIN — OXYTOCIN 20 UNITS: 10 INJECTION, SOLUTION INTRAMUSCULAR; INTRAVENOUS at 01:44

## 2017-03-02 RX ADMIN — DEXAMETHASONE SODIUM PHOSPHATE 4 MG: 4 INJECTION, SOLUTION INTRA-ARTICULAR; INTRALESIONAL; INTRAMUSCULAR; INTRAVENOUS; SOFT TISSUE at 01:50

## 2017-03-02 RX ADMIN — ONDANSETRON 4 MG: 2 INJECTION INTRAMUSCULAR; INTRAVENOUS at 01:50

## 2017-03-02 RX ADMIN — SODIUM CHLORIDE, SODIUM LACTATE, POTASSIUM CHLORIDE, AND CALCIUM CHLORIDE: 600; 310; 30; 20 INJECTION, SOLUTION INTRAVENOUS at 00:55

## 2017-03-02 RX ADMIN — HYDROMORPHONE HYDROCHLORIDE 0.5 MG: 1 INJECTION, SOLUTION INTRAMUSCULAR; INTRAVENOUS; SUBCUTANEOUS at 05:13

## 2017-03-02 RX ADMIN — SODIUM CHLORIDE, SODIUM LACTATE, POTASSIUM CHLORIDE, AND CALCIUM CHLORIDE 150 ML/HR: 600; 310; 30; 20 INJECTION, SOLUTION INTRAVENOUS at 00:20

## 2017-03-02 NOTE — PROGRESS NOTES
TRANSFER - IN REPORT:    Verbal report received from [address] [place] [address] 280 (name) on Nahum Battles  being received from L&D (unit) for routine progression of care      Report consisted of patients Situation, Background, Assessment and   Recommendations(SBAR). Information from the following report(s) SBAR, Kardex, OR Summary, Intake/Output, MAR, Recent Results and Med Rec Status was reviewed with the receiving nurse. Opportunity for questions and clarification was provided. Assessment completed upon patients arrival to unit and care assumed.

## 2017-03-02 NOTE — ROUTINE PROCESS
Bedside shift change report given to edwardo Hurst rn (oncoming nurse) by katheryn paz RN(offgoing nurse). Report included the following information Kardex, Procedure Summary, Intake/Output, MAR and Recent Results.

## 2017-03-02 NOTE — PROGRESS NOTES
Midwife in to exam pt. SVE 1/90/-2.  Pt is TOLAC; will be admitted for therapeutic rest tonight and revisit plan in am.

## 2017-03-02 NOTE — ANESTHESIA PREPROCEDURE EVALUATION
Anesthetic History   No history of anesthetic complications            Review of Systems / Medical History  Patient summary reviewed, nursing notes reviewed and pertinent labs reviewed    Pulmonary  Within defined limits                 Neuro/Psych   Within defined limits           Cardiovascular  Within defined limits                     GI/Hepatic/Renal  Within defined limits              Endo/Other  Within defined limits           Other Findings              Physical Exam    Airway  Mallampati: II  TM Distance: 4 - 6 cm  Neck ROM: normal range of motion   Mouth opening: Normal     Cardiovascular  Regular rate and rhythm,  S1 and S2 normal,  no murmur, click, rub, or gallop  Rhythm: regular  Rate: normal         Dental  No notable dental hx       Pulmonary  Breath sounds clear to auscultation               Abdominal  Abdominal exam normal       Other Findings            Anesthetic Plan    ASA: 2  Anesthesia type: spinal      Post-op pain plan if not by surgeon: intrathecal opiates      Anesthetic plan and risks discussed with: Patient and Spouse

## 2017-03-02 NOTE — PROGRESS NOTES
Admission complete. FHT baseline 135-140; deceleration noted. Pt re-positioned; bolus started, pt not tolerating contractions at this time. Pt continues to verbalize, \"I do not want a vaginal birth, I want to get this over with. I want a . \" Therapeutic rest meds not adminstered as decels are present with no accelerations. Midwife aware. MD notified.

## 2017-03-02 NOTE — PROGRESS NOTES
Yanni care complete; new gown and yanni pad placed; fundus firm @ U. Bleeding and BP stable. Pt with mild pain; will medicate before transfer. Lungs clear bilaterally. PIV with no s/s of infiltration with IVF infusing @ 125/ml. No edema. Compression stocking on. 1000 ml of clear yellow urine emptied from michel. Transferring soon.

## 2017-03-02 NOTE — L&D DELIVERY NOTE
Delivery Summary    Patient: Eric Cha MRN: [id number]  SSN: [id number]    YOB: 1980  Age: 39 y.o. Sex: female        Labor Events:    Labor: No    Rupture Date: 3/2/2017    Rupture Time: 12:30 AM    Rupture Type: SROM    Amniotic Fluid Volume:       Amniotic Fluid Description:  Amniotic fluid Odor: Meconium          Induction: None         Induction Date:        Induction Time:       Indications for Induction:       Augmentation: None    Augmentation Date:      Augmentation Time:      Indications for Augmentation:      Events:        Cervical Ripening:       None    Rupture Identifier:       Labor complications: Additional complications:         Delivery Events:  Estimated Blood Loss (ml):        Information for the patient's :  Teresa Moffett [[id number]]     Delivery Summary - Baby    Delivery Date: 3/2/2017  Delivery Time: 1:45 AM  Delivery Type: , Low Transverse    Section Delivery:     Sex:  male     Gestational Age: 38w11d  Delivery Clinician:  Sailaja Zelaya   Living?: Yes  Delivery Location: L&D           APGARS  One minute Five minutes Ten minutes   Skin color: 1   1        Heart rate: 2   2        Grimace: 2   2        Muscle tone: 2   2        Breathin   2        Totals: 9   9           Presentation: Vertex    Position:        Resuscitation Method:  Tactile Stimulation;Suctioning-deep     Meconium Stained:  Thick      Cord Vessels: 3 Vessels      Cord Events:    Cord Blood Sent?:  Yes    Blood Gases Sent?:  Yes    Placenta:  Date/Time: 3/2  1:45 AM  Removal: Spontaneous      Appearance: Normal      Measurements:  Birth Weight: 3.04 kg      Birth Length: 50.8 cm      Head Circumference: 34 cm      Chest Circumference: 31.5 cm     Abdominal Girth: 29 cm    Other Providers:           Group Beta Strep:   Lab Results   Component Value Date/Time    GrBStrep, External neg 2017        Cord Blood Results:  Information for the patient's :  Mary Lozoya [[id number]]     Lab Results   Component Value Date/Time    [address] O NEGATIVE 2017 03:00 AM    PCTDIG NEG 2017 03:00 AM     Information for the patient's :  Charlesbilly Fariaser [[id number]]   No results found for: APH, APCO2, APO2, AHCO3, ABEC, ABDC, O2ST, SITE, [place], PHI, [place], PO2I, HCO3I, SO2I, IBD    Information for the patient's :  Charlesbilly Fariaser [[id number]]   No results found for: EPHV, PCO2V, PO2V, HCO3V, O2STV, EBDV

## 2017-03-02 NOTE — PROGRESS NOTES
0032 - ASROm of thick , particulate meconium. Deceleration to 80 X 3 mins, with recover to baseline with knee chest. Dr Alvarado Moreno called, and anesthesia team , SA for expedited RCS.  Diana Shaikh

## 2017-03-02 NOTE — LACTATION NOTE
Mom states baby has nursed well. Experienced mom, breast fed her first child. Baby in [facility] hold, latched and started nursing well, strong suck. Discussed cluster feeding. Info sheet and daily log given. Encouraged to call with questions.

## 2017-03-02 NOTE — ADT AUTH CERT NOTES
Patient Demographics        Patient Name [address] Sex  Address Phone       Jaren Rose [id number] Female 1980 [address] [place] [id number] [phone] (Mobile)           CSN:       [id number]           Admit Date: Admit Time Room Bed       Mar 1, 2017 11:22 PM 3401 [[id number]] 01 [[id number]]           Attending Providers        Provider Pager From To       Jatinder Yanes MD  17       ADM 3/1 DEL 3/2      Delivery Date: 3/2/2017   Delivery Time: 1:45 AM   Delivery Type: , Low Transverse  Sex: male   Gestational Age: 38w11d     Adrian Measurements:  Birth Weight: 3.04 kg    Birth Length: 20\"          Delivery Clinician: Jatinder Yanes  Living?:   Delivery Location: L&D

## 2017-03-02 NOTE — ANESTHESIA PROCEDURE NOTES
Spinal Block    Start time: 3/2/2017 1:09 AM  End time: 3/2/2017 1:17 AM  Performed by: Maulik Rosa  Authorized by: Bashir Capellan     Pre-procedure:   Indications: primary anesthetic  Preanesthetic Checklist: patient identified, risks and benefits discussed, anesthesia consent, site marked, patient being monitored and timeout performed    Timeout Time: 01:09          Spinal Block:   Patient Position:  Seated  Prep Region:  Lumbar  Prep: Betadine      Location:  L3-4    Local:  Lidocaine 1%  Local Dose (mL):  2    Needle:   Needle Type:  Pencan  Needle Gauge:  24 G        Events: CSF confirmed, no blood with aspiration and no paresthesia        Assessment:  Insertion:  Uncomplicated  Patient tolerance:  Patient tolerated the procedure well with no immediate complications

## 2017-03-02 NOTE — PROGRESS NOTES
Pt call on call bell; stated membranes have rupture. Went to assess pt; SROM with meconium noted. Midwife aware. MD to be notified.

## 2017-03-02 NOTE — H&P
Obstetric Admission History and Physical    Name: Scott Whyte MRN: [id number] SSN: [id number]    YOB: 1980  Age: 39 y.o. Sex: female       Subjective:      Chief complaint:  contractions      Jessica Connor is a 39 y.o.  female, G 2 P 1 who presents at 39.4 weeks gestation with [facility] 3/4. On admission EFM strip is obtained and is Category 2/ 2 decels noted. She reports onset of symptoms at 1500. Was present on unit last night for same contractions, but contractions subsided and pt d/sean home at 0700 on 3/1. OB HISTORY  Prenatal care started at 13 wks with [facility] . Transfer with supporting prenatal info. Problems of pregnancy include AMA- declined NIPT; fibroid 4 cms in right fundus; anemia 10.2 at 28 wks/ supplementation improved at 34 wks = 10.6 ./ previous PCS for NR FHR. Total wt gain 12 wks . GBS negative. PAST PREGNANCY HISTORY   PCS F 8 lb 6 oz  NR FHR    PAST MEDICAL / SURGICAL HISTORY  Past Medical History:   Diagnosis Date    Abnormal Papanicolaou smear of cervix     hx hpv----to be rechecked    Anemia      Problem List as of 3/1/2017  Date Reviewed: 3/1/2017          Codes Class Noted - Resolved    * (Principal)Labor and delivery indication for care or intervention ICD-10-CM: O75.9  ICD-9-CM: 659.90  3/1/2017 - Present        H/O  section ICD-10-CM: Z98.891  ICD-9-CM: V45.89  3/1/2017 - Present        Anemia affecting pregnancy in third trimester ICD-10-CM: O99.013  ICD-9-CM: 648.23, 285.9  3/1/2017 - Present        Fibroid ICD-10-CM: D25.9  ICD-9-CM: 218.9  3/1/2017 - Present        AMA (advanced maternal age) multigravida 35+ ICD-10-CM: O09.529  ICD-9-CM: 659.60  3/1/2017 - Present              FAMILY/ SOCIAL HISTORY  Social History     Social History    Marital status: SINGLE     Spouse name: N/A    Number of children: 1    Years of education: 25     Occupational History    Not on file.      Social History Main Topics    Smoking status: Former Smoker     Quit date: 1/1/2015    Smokeless tobacco: Not on file    Alcohol use No    Drug use: No    Sexual activity: Yes     Partners: Male     Other Topics Concern    Not on file     Social History Narrative          ALLERGY:  Allergies   Allergen Reactions    Sulfa (Sulfonamide Antibiotics) Hives       Review of Systems:  A comprehensive review of systems was negative      Objective:     VITAL SIGNS:  There were no vitals taken for this visit. Physical Exam:  Abdomen: soft  Position of baby vtx  Estimated fetal weight 7 lbs  Contractions q 2-5 mins/mod quality  FHR baseline at  130 bpm/ variability moderate/Category 2  External Genitalia: normal general appearance without lesions  Cervix: Dilation: 1cm/80%/-3  Membranes  intact    Assessment:     1) 39 weeks Intrauterine Pregnancy . 2) labor management      Plan:     FHR on arrival with baseline of 140 at 2320. At 2333 had decel to 110-100/ then again at 2340 and 2355 X 4 minutes. IV started and position changed. Discussed with Angel Backers as she no longer desires trial of labor. Discussed with Dr Alida Romero, will try to therapeutic rest tonight and do RCS  At 0600. Discussed therapeutic rest of  IV meds vs epidural for anesthesia for pain relief and coverage of possible emergent surgery.         Signed By:  Gilda Morales CNM     March 1, 2017

## 2017-03-03 PROBLEM — D64.9 ANEMIA: Status: ACTIVE | Noted: 2017-03-03

## 2017-03-03 LAB
HCT VFR BLD AUTO: 27.8 % (ref 35–45)
HGB BLD-MCNC: 9.1 G/DL (ref 12–16)

## 2017-03-03 PROCEDURE — 74011250637 HC RX REV CODE- 250/637: Performed by: OBSTETRICS & GYNECOLOGY

## 2017-03-03 PROCEDURE — 65270000029 HC RM PRIVATE

## 2017-03-03 PROCEDURE — 74011250637 HC RX REV CODE- 250/637: Performed by: ADVANCED PRACTICE MIDWIFE

## 2017-03-03 PROCEDURE — 85018 HEMOGLOBIN: CPT | Performed by: OBSTETRICS & GYNECOLOGY

## 2017-03-03 PROCEDURE — 36415 COLL VENOUS BLD VENIPUNCTURE: CPT | Performed by: OBSTETRICS & GYNECOLOGY

## 2017-03-03 RX ORDER — IBUPROFEN 400 MG/1
800 TABLET ORAL
Status: DISCONTINUED | OUTPATIENT
Start: 2017-03-03 | End: 2017-03-04 | Stop reason: HOSPADM

## 2017-03-03 RX ADMIN — OXYCODONE HYDROCHLORIDE AND ACETAMINOPHEN 2 TABLET: 5; 325 TABLET ORAL at 01:39

## 2017-03-03 RX ADMIN — OXYCODONE HYDROCHLORIDE AND ACETAMINOPHEN 2 TABLET: 5; 325 TABLET ORAL at 20:05

## 2017-03-03 RX ADMIN — OXYCODONE HYDROCHLORIDE AND ACETAMINOPHEN 2 TABLET: 5; 325 TABLET ORAL at 09:51

## 2017-03-03 RX ADMIN — IBUPROFEN 800 MG: 400 TABLET, FILM COATED ORAL at 19:04

## 2017-03-03 RX ADMIN — OXYCODONE HYDROCHLORIDE AND ACETAMINOPHEN 2 TABLET: 5; 325 TABLET ORAL at 05:38

## 2017-03-03 RX ADMIN — IBUPROFEN 800 MG: 400 TABLET, FILM COATED ORAL at 02:44

## 2017-03-03 RX ADMIN — OXYCODONE HYDROCHLORIDE AND ACETAMINOPHEN 2 TABLET: 5; 325 TABLET ORAL at 15:41

## 2017-03-03 RX ADMIN — IBUPROFEN 800 MG: 400 TABLET, FILM COATED ORAL at 11:02

## 2017-03-03 NOTE — PROGRESS NOTES
Visited mother. Acquired permission to [place].     [facility] [place]   [phone]

## 2017-03-03 NOTE — ANESTHESIA POSTPROCEDURE EVALUATION
Post-Anesthesia Evaluation & Assessment    Visit Vitals    /73 (BP 1 Location: Right arm, BP Patient Position: At rest)    Pulse 98    Temp 36.8 °C (98.3 °F)    Resp 17    Ht 5' 4\" (1.626 m)    Wt 89.8 kg (198 lb)    SpO2 100%    Breastfeeding Yes    BMI 33.99 kg/m2       Nausea/Vomiting: no nausea and no vomiting    Post-operative hydration adequate. Pain score (VAS): 2    Mental status & Level of consciousness: alert and oriented x 3    Neurological status: moves all extremities, sensation grossly intact    Pulmonary status: airway patent, no supplemental oxygen required    Complications related to anesthesia: none    Patient has met all discharge requirements.     Additional comments:        Tahira Chan, CRNA

## 2017-03-03 NOTE — ROUTINE PROCESS
Mother doing well. Up without complaints. Voiding without problems. Pain managed well with motrin and percocet. Educated patient on appropriate bleeding and when to call nurse. Fundus firm. Bonding well with baby.

## 2017-03-03 NOTE — PROGRESS NOTES
Post-Operative  Progress Note    Patient doing well post-op day 1 from  delivery without significant complaints. Pain controlled on current medication. Voiding without difficulty, normal lochia. Tolerating diet. Baby at the breast.   Pain controlled with exception of a period of time last night. Discussed recovery and important role of pain management and she already has this understanding. Encouragement and support for good pain management to support ambulation. Vitals:   Patient Vitals for the past 24 hrs:   Temp Pulse Resp BP SpO2   17 0000 98.3 °F (36.8 °C) 98 17 112/73 100 %   17 2024 98.5 °F (36.9 °C) (!) 105 17 116/71 100 %   17 1505 98.7 °F (37.1 °C) 95 18 105/57 100 %   17 1117 98.5 °F (36.9 °C) 93 17 109/62 97 %   17 0745 97.4 °F (36.3 °C) 89 18 110/65 100 %        Exam:  Patient without distress. Abdomen soft, fundus firm at level of umbilicus, non tender. Incision dry and clean without erythema. Lower extremities are negative for swelling, cords or tenderness. Lab/Data Review: All lab results for the last 24 hours reviewed. Anemia noted. Assessment:  POD # 1 s/p  section for fibroids    Plan:   Routine postop care. Advance diet. Encourage ambulation. Encourage breastfeeding.       Alexander Ghosh CNM  March 3, 2017

## 2017-03-03 NOTE — ROUTINE PROCESS
0720--Bedside and Verbal shift change report given to Wiley Song RN (oncoming nurse) by DORCAS Vazquez (offgoing nurse). Report included the following information SBAR, Kardex, Intake/Output, MAR and Recent Results.

## 2017-03-03 NOTE — PROGRESS NOTES
Plan:  To promote family bonding. Implementation:  Provided live bedside harp music, lullabies. Evaluation:  Mom nursing baby Cayetano, Dad at beside. Room quiet and peaceful during music time. Mom states \"that was beautiful, but I almost fell asleep! \"

## 2017-03-04 VITALS
RESPIRATION RATE: 18 BRPM | TEMPERATURE: 98.2 F | HEIGHT: 64 IN | OXYGEN SATURATION: 100 % | WEIGHT: 198 LBS | DIASTOLIC BLOOD PRESSURE: 74 MMHG | HEART RATE: 93 BPM | BODY MASS INDEX: 33.8 KG/M2 | SYSTOLIC BLOOD PRESSURE: 117 MMHG

## 2017-03-04 LAB
HCT VFR BLD AUTO: 27 % (ref 35–45)
HGB BLD-MCNC: 9 G/DL (ref 12–16)

## 2017-03-04 PROCEDURE — 74011250637 HC RX REV CODE- 250/637: Performed by: ADVANCED PRACTICE MIDWIFE

## 2017-03-04 PROCEDURE — 74011250637 HC RX REV CODE- 250/637: Performed by: OBSTETRICS & GYNECOLOGY

## 2017-03-04 PROCEDURE — 85018 HEMOGLOBIN: CPT | Performed by: OBSTETRICS & GYNECOLOGY

## 2017-03-04 PROCEDURE — 36415 COLL VENOUS BLD VENIPUNCTURE: CPT | Performed by: OBSTETRICS & GYNECOLOGY

## 2017-03-04 RX ORDER — OXYCODONE AND ACETAMINOPHEN 5; 325 MG/1; MG/1
1-2 TABLET ORAL
Qty: 30 TAB | Refills: 0 | Status: SHIPPED | OUTPATIENT
Start: 2017-03-04

## 2017-03-04 RX ORDER — AMOXICILLIN 250 MG
1 CAPSULE ORAL DAILY
Status: DISCONTINUED | OUTPATIENT
Start: 2017-03-04 | End: 2017-03-04 | Stop reason: HOSPADM

## 2017-03-04 RX ORDER — IBUPROFEN 800 MG/1
800 TABLET ORAL
Qty: 60 TAB | Refills: 1 | Status: SHIPPED | OUTPATIENT
Start: 2017-03-04

## 2017-03-04 RX ADMIN — OXYCODONE HYDROCHLORIDE AND ACETAMINOPHEN 2 TABLET: 5; 325 TABLET ORAL at 00:01

## 2017-03-04 RX ADMIN — OXYCODONE HYDROCHLORIDE AND ACETAMINOPHEN 1 TABLET: 5; 325 TABLET ORAL at 05:24

## 2017-03-04 RX ADMIN — STANDARDIZED SENNA CONCENTRATE AND DOCUSATE SODIUM 1 TABLET: 8.6; 5 TABLET, FILM COATED ORAL at 10:25

## 2017-03-04 RX ADMIN — OXYCODONE HYDROCHLORIDE AND ACETAMINOPHEN 2 TABLET: 5; 325 TABLET ORAL at 10:25

## 2017-03-04 RX ADMIN — IBUPROFEN 800 MG: 400 TABLET, FILM COATED ORAL at 05:24

## 2017-03-04 NOTE — ROUTINE PROCESS
0700--Bedside and Verbal shift change report given to Wiley Song RN (oncoming nurse) by [employer], RN (offgoing nurse). Report included the following information SBAR, Kardex, Procedure Summary, Intake/Output, MAR and Recent Results.

## 2017-03-04 NOTE — DISCHARGE SUMMARY
[address] [facility]  [address] [facility]  [phone]       Obstetrical Discharge Summary     Patient ID:  Lai Cabral  [id number]  62 y.o.  1980    Admit Date: 3/1/2017    Discharge Date: 3/4/2017     Admitting Physician: Jake Dewitt MD     Admission Diagnoses: maternity  Labor and delivery indication for care or intervention    Final Diagnosis: [email or web address] Delivered    Additional Diagnoses:Present on Admission:   Labor and delivery indication for care or intervention   H/O  section   Anemia affecting pregnancy in third trimester   Fibroid   AMA (advanced maternal age) multigravida 35+         OB History      Para Term  AB TAB SAB Ectopic Multiple Living    3 2 2  1 1   0 2        Lab Results   Component Value Date/Time    Rubella, External immune 2016    GrBStrep, External neg 2017       Baby link  Information for the patient's :  Génesis Hu [[id number]]     Delivery Type: , Low Transverse   Delivery Date: 3/2/2017   Delivery Time: 1:45 AM     Birth Weight: 3.04 kg     Sex:  male  Delivery Clinician:  Delmy Lundberg   Gestational Age: Gestational Age: 38w11d    Presentation: Vertex   Position:             Apgars were 9  and 9      Resuscitation Method: Tactile Stimulation;Suctioning-deep     Meconium Stained:  Thick  Living Status: Yes       Placenta Date/Time: 3/2  1:45 AM   Placenta Removal: Spontaneous   Placenta Appearance: Vertex [1]     Cord Information: 3 Vessels    Cord Events: Nuchal Cord Without Compressions       Cord Blood Sent?:  Yes    Blood Gases Sent?:  Yes      Infant Information: Information for the patient's :  Génesis Hu [[id number]]   One Minute Apgar: 9 (Filed from Delivery Summary)  Five  Minute Apgar: 9 (Filed from Delivery Summary)     Baby Procedures:    Information for the patient's :  Génesis Hu [[id number]]   Procedure to be Performed: Circumcision    Diet: Regular  Feeding Method:      Vitals:  Patient Vitals for the past 8 hrs:   BP Temp Pulse Resp SpO2   17 0700 117/74 98.2 °F (36.8 °C) 93 18 100 %   17 0400 97/57 97.7 °F (36.5 °C) 89 18 98 %     Temp (24hrs), Av °F (36.7 °C), Min:97.7 °F (36.5 °C), Max:98.3 °F (36.8 °C)      Vital signs stable, afebrile. Exam:  Patient is in good general condition. Emotionally: appears to be stable  Fundus:  Firm and not tender. Lower extremities are negative for swelling, cords or tenderness. Lab/Data Review:  CBC: Lab Results   Component Value Date/Time    WBC 10.1 2017 11:50 PM    RBC 4.08 2017 11:50 PM    HGB 9.0 2017 06:48 AM    HCT 27.0 2017 06:48 AM    PLATELET 789  11:50 PM     Lab results reviewed. For significant abnormal values and values requiring intervention, see assessment and plan. Activity: as tolerated    Discharge Instructions: Nothing in vagina, no heavy lifting or exercise for 6 weeks. No driving for 1 week or while taking narcotics. SITZ bath for perineal discomfort. F/U 6 weeks post partum check. Call for heavy bleeding, temperature over 101 degrees, heavy vaginal bleeding, foul vaginal odor or worsening abdominal pain. Medications:   Current Discharge Medication List      START taking these medications    Details   ibuprofen (MOTRIN) 800 mg tablet Take 1 Tab by mouth every eight (8) hours as needed. Qty: 60 Tab, Refills: 1      oxyCODONE-acetaminophen (PERCOCET) 5-325 mg per tablet Take 1-2 Tabs by mouth every six (6) hours as needed. Max Daily Amount: 8 Tabs. Qty: 30 Tab, Refills: 0         CONTINUE these medications which have NOT CHANGED    Details   docusate sodium (COLACE) 100 mg capsule Take 100 mg by mouth two (2) times daily as needed for Constipation. prenatal multivit-ca-min-fe-fa tab Take 1 Tab by mouth daily. cholecalciferol (VITAMIN D3) 1,000 unit cap Take 1,000 Units by mouth daily.          STOP taking these medications       ferrous sulfate (IRON) 325 mg (65 mg iron) tablet Comments:   Reason for Stopping:                 Condition at discharge: Stable and doing well.   Disposition: Home    March 4, 2017  Nona Barnes

## 2017-03-04 NOTE — DISCHARGE INSTRUCTIONS

## 2017-03-04 NOTE — ROUTINE PROCESS
Discharge instructions reviewed with patients, understanding verbalized of all instructions given. Time given for questions, all questions answered. Electronic signature obtained. Mom states that she scheduled her follow up appointment with Dr. Xavi Tovar for 6 weeks from now.

## 2017-06-02 NOTE — PROGRESS NOTES
[facility]   PATIENT INFORMATION   CSN:  [id number]    Patient Name: Gloria Benitez ID: [id number]   Address:  [address]  [facility] [address]   Sex: Female Mar Stat: SINGLE   : 1980 Age: 39 yrs   Home Phone:   Mobile Phone: [phone]   Work Phone:   Employer: [employer]   Race: BLACK OR   Roman Catholic: NON Yazidi    ADMISSION INFORMATION   Adm Date: 3/1/2017 Adm Time: 2322   Patient Class: Inpatient Service: Obstetrics   Adm Source: Non-health care facility* Adm Type: ELECTIVE   Admitting Prov: Boris Pérez Attending Prov: Boris Pérez   Unit: [address] [place] [address] Mother Baby Unit Room/Bed: [address] [place] [address]    Adm Diagnosis: maternity;Labor and delivery indication for care or intervention    Disch Date: 3/4/2017 Disch Time:  INFORMATION   Name:  Meredith Javedtristian Phone:   Rel :  Self   Address: [address] [place] [address] [address] Larissa [address]   Name: Geeta Pearson Phone: [phone] Rel: Boyfriend;   COVERAGE INFORMATION   Primary Ins:  [facility] Insured Name: Meredith Solorio   Plan Name: Amanda Gifford       Claim Address: [address] Rel to Patient: Self      Sex: Female      Policy #:  [id number]    Group # [id number] Group Name:  Vitaly Parker #: [id number]*B Ins Phone:     Secondary Ins:   Insured Name:     Claim Address:  Rel to Patient: N/A      Sex:        Policy #: N/A    Group #: N/A Group Name: N/A   Auth #: N/A Ins Phone:     Accident Date:    Accident Type:     PROVIDER INFORMATION   PCP:  Gerardo Knox MD PCP Phone:  [phone]   Referring Prov:  No ref.  provider found Referring Phone:  N/A   Advanced Directive:  Not Received Language:  ENGLISH

## 2020-07-06 NOTE — PROGRESS NOTES
Addended by: ALLYSON DUMONT on: 7/6/2020 03:47 PM     Modules accepted: Orders     To lower extremities. Mom encouraged elevate fob.

## (undated) DEVICE — TOWEL SURG W16XL26IN BLU NONFENESTRATED DLX ST 2 PER PK

## (undated) DEVICE — SUT MONOCRYL PLUS UD 4-0 --

## (undated) DEVICE — INTENDED FOR TISSUE SEPARATION, AND OTHER PROCEDURES THAT REQUIRE A SHARP SURGICAL BLADE TO PUNCTURE OR CUT.: Brand: BARD-PARKER SAFETY BLADES SIZE 10, STERILE

## (undated) DEVICE — MASK ROUND 60MM FPH (10) S/USE: Brand: FISHER & PAYKEL HEALTHCARE

## (undated) DEVICE — STERILE POLYISOPRENE POWDER-FREE SURGICAL GLOVES: Brand: PROTEXIS

## (undated) DEVICE — CATH SUC CTRL PRT TRIFLO 8FR --

## (undated) DEVICE — SUTURE VCRL SZ 0 L36IN ABSRB VLT L40MM CT 1/2 CIR J358H

## (undated) DEVICE — SUTURE VCRL SZ 3-0 L27IN ABSRB UD L36MM CT-1 1/2 CIR J258H

## (undated) DEVICE — SHEET,DRAPE,40X58,STERILE: Brand: MEDLINE

## (undated) DEVICE — GOWN,AURORA,FABRIC-REINFORCED,X-LARGE: Brand: MEDLINE

## (undated) DEVICE — SUTURE PLN GUT SZ 2-0 L27IN ABSRB YELLOWISH TAN L70MM XLH 53T

## (undated) DEVICE — MEDI-VAC NON-CONDUCTIVE SUCTION TUBING 6MM X 6.1M (20 FT.) L: Brand: CARDINAL HEALTH

## (undated) DEVICE — S/USE RESUS KIT W/O MASK (10): Brand: FISHER & PAYKEL HEALTHCARE

## (undated) DEVICE — Device

## (undated) DEVICE — SPONGE LAP 18X18IN STRL -- 5/PK

## (undated) DEVICE — (D)SYR 10ML 1/5ML GRAD NSAF -- PKGING CHANGE USE ITEM 338027

## (undated) DEVICE — KENDALL SCD EXPRESS SLEEVES, KNEE LENGTH, MEDIUM: Brand: KENDALL SCD

## (undated) DEVICE — MEDI-VAC NON-CONDUCTIVE SUCTION TUBING: Brand: CARDINAL HEALTH

## (undated) DEVICE — FLEX ADVANTAGE 1500CC: Brand: FLEX ADVANTAGE

## (undated) DEVICE — PACK PROCEDURE SURG C SECT DMC